# Patient Record
Sex: FEMALE | Race: WHITE | NOT HISPANIC OR LATINO | Employment: UNEMPLOYED | URBAN - METROPOLITAN AREA
[De-identification: names, ages, dates, MRNs, and addresses within clinical notes are randomized per-mention and may not be internally consistent; named-entity substitution may affect disease eponyms.]

---

## 2017-08-04 ENCOUNTER — NURSE TRIAGE (OUTPATIENT)
Dept: ADMINISTRATIVE | Facility: CLINIC | Age: 82
End: 2017-08-04

## 2017-08-04 DIAGNOSIS — I10 ESSENTIAL HYPERTENSION: ICD-10-CM

## 2017-08-04 RX ORDER — LOSARTAN POTASSIUM 25 MG/1
TABLET ORAL
Qty: 90 TABLET | Refills: 0 | Status: CANCELLED | OUTPATIENT
Start: 2017-08-04

## 2017-08-04 RX ORDER — LOSARTAN POTASSIUM 25 MG/1
TABLET ORAL
Qty: 14 TABLET | Refills: 0 | Status: CANCELLED | OUTPATIENT
Start: 2017-08-04

## 2017-08-04 RX ORDER — LOSARTAN POTASSIUM 25 MG/1
25 TABLET ORAL DAILY
Qty: 7 TABLET | Refills: 0 | Status: SHIPPED | OUTPATIENT
Start: 2017-08-04 | End: 2017-08-07 | Stop reason: SDUPTHER

## 2017-08-04 NOTE — TELEPHONE ENCOUNTER
----- Message from Soledad Burch sent at 8/4/2017  3:22 PM CDT -----  Contact: daughter, Josee Hylton  Patient's daughter, Josee Hylton regarding patient's prescription of Losartan. Pharmacy states it was denied and would not giver her any medication. She is out of medication. Frank call daughter at [phone]. Thanks!

## 2017-08-04 NOTE — TELEPHONE ENCOUNTER
"    Reason for Disposition   [1] Request for URGENT new prescription or refill of "essential" medication (i.e., likelihood of harm to patient if not taken) AND [2] triager unable to fill per unit policy    Answer Assessment - Initial Assessment Questions  1. SYMPTOMS: "Do you have any symptoms?"      bp medication refill. No symptoms reported/  2. SEVERITY: If symptoms are present, ask "Are they mild, moderate or severe?"      n/a    Protocols used: ST MEDICATION QUESTION CALL-A-    Patient called for a refill of the losartan. She made an appt for Monday, but she is out. Called Dr. Morgan concerning the request. Dr. Morgan sent enough pills to get the patient through Monday. Patient was notified and verbalized understanding.   "

## 2017-08-04 NOTE — TELEPHONE ENCOUNTER
Dr Morgan  I got pt appt Monday with Paige. She is completley out of meds. Can she have enough for the weekend.

## 2017-08-04 NOTE — TELEPHONE ENCOUNTER
Reason for Disposition   Message left on identified answering machine.    Protocols used: ST NO CONTACT OR DUPLICATE CONTACT CALL-A-  call returned to [phone]--no answer/ left message on     Sonya Graff RN

## 2017-08-07 ENCOUNTER — OFFICE VISIT (OUTPATIENT)
Dept: FAMILY MEDICINE | Facility: CLINIC | Age: 82
End: 2017-08-07
Payer: MEDICARE

## 2017-08-07 ENCOUNTER — LAB VISIT (OUTPATIENT)
Dept: LAB | Facility: HOSPITAL | Age: 82
End: 2017-08-07
Attending: INTERNAL MEDICINE
Payer: MEDICARE

## 2017-08-07 VITALS
WEIGHT: 125.44 LBS | TEMPERATURE: 98 F | SYSTOLIC BLOOD PRESSURE: 132 MMHG | OXYGEN SATURATION: 98 % | DIASTOLIC BLOOD PRESSURE: 78 MMHG | HEIGHT: 62 IN | HEART RATE: 79 BPM | RESPIRATION RATE: 18 BRPM | BODY MASS INDEX: 23.08 KG/M2
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
## 2017-08-07 DIAGNOSIS — I10 ESSENTIAL HYPERTENSION: ICD-10-CM

## 2017-08-07 DIAGNOSIS — M79.10 MYALGIA: ICD-10-CM

## 2017-08-07 LAB
25(OH)D3+25(OH)D2 SERPL-MCNC: 33 NG/ML
ALBUMIN SERPL BCP-MCNC: 3.6 G/DL
ALP SERPL-CCNC: 120 U/L
ALT SERPL W/O P-5'-P-CCNC: 35 U/L
ANION GAP SERPL CALC-SCNC: 9 MMOL/L
AST SERPL-CCNC: 33 U/L
BASOPHILS # BLD AUTO: 0.02 K/UL
BASOPHILS NFR BLD: 0.3 %
BILIRUB SERPL-MCNC: 0.9 MG/DL
BUN SERPL-MCNC: 30 MG/DL
CALCIUM SERPL-MCNC: 9.2 MG/DL
CHLORIDE SERPL-SCNC: 106 MMOL/L
CHOLEST/HDLC SERPL: 2.9 {RATIO}
CO2 SERPL-SCNC: 25 MMOL/L
CREAT SERPL-MCNC: 0.9 MG/DL
DIFFERENTIAL METHOD: ABNORMAL
EOSINOPHIL # BLD AUTO: 0.1 K/UL
EOSINOPHIL NFR BLD: 1.8 %
ERYTHROCYTE [DISTWIDTH] IN BLOOD BY AUTOMATED COUNT: 14.1 %
EST. GFR  (AFRICAN AMERICAN): >60 ML/MIN/1.73 M^2
EST. GFR  (NON AFRICAN AMERICAN): 59.7 ML/MIN/1.73 M^2
GLUCOSE SERPL-MCNC: 88 MG/DL
HCT VFR BLD AUTO: 36.8 %
HDL/CHOLESTEROL RATIO: 34.6 %
HDLC SERPL-MCNC: 101 MG/DL
HDLC SERPL-MCNC: 292 MG/DL
HGB BLD-MCNC: 12.2 G/DL
LDLC SERPL CALC-MCNC: 178.4 MG/DL
LYMPHOCYTES # BLD AUTO: 2.1 K/UL
LYMPHOCYTES NFR BLD: 30.4 %
MCH RBC QN AUTO: 29.9 PG
MCHC RBC AUTO-ENTMCNC: 33.2 G/DL
MCV RBC AUTO: 90 FL
MONOCYTES # BLD AUTO: 0.4 K/UL
MONOCYTES NFR BLD: 6 %
NEUTROPHILS # BLD AUTO: 4.2 K/UL
NEUTROPHILS NFR BLD: 61.4 %
NONHDLC SERPL-MCNC: 191 MG/DL
PLATELET # BLD AUTO: 219 K/UL
PMV BLD AUTO: 12 FL
POTASSIUM SERPL-SCNC: 4.3 MMOL/L
PROT SERPL-MCNC: 7.8 G/DL
RBC # BLD AUTO: 4.08 M/UL
SODIUM SERPL-SCNC: 140 MMOL/L
TRIGL SERPL-MCNC: 63 MG/DL
TSH SERPL DL<=0.005 MIU/L-ACNC: 0.74 UIU/ML
WBC # BLD AUTO: 6.82 K/UL

## 2017-08-07 PROCEDURE — 99999 PR PBB SHADOW E&M-EST. PATIENT-LVL III: CPT | Mod: PBBFAC,,, | Performed by: NURSE PRACTITIONER

## 2017-08-07 PROCEDURE — 80061 LIPID PANEL: CPT

## 2017-08-07 PROCEDURE — 82306 VITAMIN D 25 HYDROXY: CPT

## 2017-08-07 PROCEDURE — 1159F MED LIST DOCD IN RCRD: CPT | Mod: ,,, | Performed by: NURSE PRACTITIONER

## 2017-08-07 PROCEDURE — 1125F AMNT PAIN NOTED PAIN PRSNT: CPT | Mod: ,,, | Performed by: NURSE PRACTITIONER

## 2017-08-07 PROCEDURE — 84443 ASSAY THYROID STIM HORMONE: CPT

## 2017-08-07 PROCEDURE — 85025 COMPLETE CBC W/AUTO DIFF WBC: CPT

## 2017-08-07 PROCEDURE — 80053 COMPREHEN METABOLIC PANEL: CPT

## 2017-08-07 PROCEDURE — 36415 COLL VENOUS BLD VENIPUNCTURE: CPT | Mod: PO

## 2017-08-07 PROCEDURE — 99214 OFFICE O/P EST MOD 30 MIN: CPT | Mod: S$PBB,,, | Performed by: NURSE PRACTITIONER

## 2017-08-07 RX ORDER — LOSARTAN POTASSIUM 25 MG/1
25 TABLET ORAL DAILY
Qty: 90 TABLET | Refills: 1 | Status: SHIPPED | OUTPATIENT
Start: 2017-08-07 | End: 2018-02-08 | Stop reason: SDUPTHER

## 2017-08-08 ENCOUNTER — TELEPHONE (OUTPATIENT)
Dept: FAMILY MEDICINE | Facility: CLINIC | Age: 82
End: 2017-08-08

## 2017-08-08 DIAGNOSIS — E78.5 HYPERLIPIDEMIA, UNSPECIFIED HYPERLIPIDEMIA TYPE: Primary | ICD-10-CM

## 2017-08-08 RX ORDER — PRAVASTATIN SODIUM 40 MG/1
40 TABLET ORAL DAILY
Qty: 90 TABLET | Refills: 1 | Status: SHIPPED | OUTPATIENT
Start: 2017-08-08 | End: 2018-02-08 | Stop reason: SDUPTHER

## 2017-08-08 NOTE — TELEPHONE ENCOUNTER
Spoke with patient, updated on labs, discussed hyperlipidemia. Educated on diet, exercise and medication. Med sent to pharmacy.    Please call and schedule her for repeat labs in 6 weeks (hepatic function panel and lipid panel---make sure she knows it must be fasting).

## 2017-08-09 NOTE — TELEPHONE ENCOUNTER
----- Message from Josee Arcos sent at 8/8/2017  4:33 PM CDT -----  Contact:   granddaughter katt//    Calling to  Speak to the  Nurse about   Lab  results

## 2017-09-20 ENCOUNTER — LAB VISIT (OUTPATIENT)
Dept: LAB | Facility: HOSPITAL | Age: 82
End: 2017-09-20
Attending: INTERNAL MEDICINE
Payer: MEDICARE

## 2017-09-20 DIAGNOSIS — E78.5 HYPERLIPIDEMIA, UNSPECIFIED HYPERLIPIDEMIA TYPE: ICD-10-CM

## 2017-09-20 LAB
ALBUMIN SERPL BCP-MCNC: 3.5 G/DL
ALP SERPL-CCNC: 95 U/L
ALT SERPL W/O P-5'-P-CCNC: 19 U/L
AST SERPL-CCNC: 26 U/L
BILIRUB DIRECT SERPL-MCNC: 0.3 MG/DL
BILIRUB SERPL-MCNC: 0.8 MG/DL
CHOLEST SERPL-MCNC: 231 MG/DL
CHOLEST/HDLC SERPL: 2.8 {RATIO}
HDLC SERPL-MCNC: 83 MG/DL
HDLC SERPL: 35.9 %
LDLC SERPL CALC-MCNC: 133.8 MG/DL
NONHDLC SERPL-MCNC: 148 MG/DL
PROT SERPL-MCNC: 7.5 G/DL
TRIGL SERPL-MCNC: 71 MG/DL

## 2017-09-20 PROCEDURE — 80061 LIPID PANEL: CPT

## 2017-09-20 PROCEDURE — 80076 HEPATIC FUNCTION PANEL: CPT

## 2017-09-20 PROCEDURE — 36415 COLL VENOUS BLD VENIPUNCTURE: CPT | Mod: PO

## 2017-09-21 ENCOUNTER — TELEPHONE (OUTPATIENT)
Dept: FAMILY MEDICINE | Facility: CLINIC | Age: 82
End: 2017-09-21

## 2017-09-22 NOTE — TELEPHONE ENCOUNTER
Please call and tell labs stable--cholesterol improved; follow up with Dr. Morgan in 3 weeks as scheduled

## 2017-10-15 NOTE — PROGRESS NOTES
"HISTORY OF PRESENT ILLNESS:  Pt. is a 83 y.o. female presents .  I have  Not seen her in almost 4 years, at our initial appt.  9/6/16 she was seen by [facility] after syncopal admission to [facility]:    "Hospital Course (synopsis of major diagnoses, care, treatment, and services provided during the course of the hospital stay): Patient was admitted after fall with near syncope. Patient was monitored on telemetry and underwent carotid ultrasound and 2d echocardiogram. Patient had orthostatics as well and was symptomatic but blood pressure and heart rate remained stable. Patient was felt to be volume depleted and hydrated with improvement of symptoms. Patient given close follow up."    This year see was see by NP for HTN, fall risk, and myalgias.  She was started on pravastatin 40 mg and had nice response.  Vitamin D was at acceptable range.    Had 2 D echo:    CONCLUSIONS     1 - Normal left ventricular systolic function (EF 55-60%).     2 - Mild left atrial enlargement.     3 - Left ventricular diastolic dysfunction.     4 - Mild aortic regurgitation.     5 - Mild mitral regurgitation.     6 - Mild to moderate tricuspid regurgitation."    Lab Results   Component Value Date    WBC 6.82 08/07/2017    HGB 12.2 08/07/2017    HCT 36.8 (L) 08/07/2017     08/07/2017    CHOL 231 (H) 09/20/2017    TRIG 71 09/20/2017    HDL 83 (H) 09/20/2017    ALT 19 09/20/2017    AST 26 09/20/2017     08/07/2017    K 4.3 08/07/2017     08/07/2017    CREATININE 0.9 08/07/2017    BUN 30 (H) 08/07/2017    CO2 25 08/07/2017    TSH 0.744 08/07/2017     Lab Results   Component Value Date    LDLCALC 133.8 09/20/2017                ROS:  GENERAL: No fever, chills, fatigability or weight loss.  SKIN: No rashes, itching or changes in color or texture of skin.  HEAD: No headaches or recent head trauma.  EARS: Denies ear pain, discharge or vertigo.  NOSE: No loss of smell, no epistaxis; positive postnasal drip.  MOUTH & THROAT: No hoarseness or " change in voice. No excessive gum bleeding.  NODES: Denies swollen glands.  CHEST: Denies CAMPOS, cyanosis, wheezing, cough and sputum production.  CARDIOVASCULAR: Denies chest pain, PND, orthopnea or reduced exercise tolerance; denies palpatiations'  ABDOMEN: Appetite fine. No weight loss. Denies constipation, diarrhea, abdominal pain, hematemesis or blood in stool.  URINARY: No flank pain, dysuria or hematuria.  PERIPHERAL VASCULAR: No claudication or cyanosis. No edema.  MUSCULOSKELETAL: Positive joint stiffness; no swelling. Denies back pain.  NEUROLOGIC: Denies numbness    PE:   Vitals:   Vitals:    10/16/17 1032   BP: (!) 140/80   Pulse: 81   Resp: 20     My repeat: 138/64    GENERAL: no acute distress, A&Ox3, comfortable.  Female with BMI of 22   HEENT: tympanic membranes clear, nasal mucosa pink, no pharyngeal erythema or exudate  NECK: supple, no cervical lymphadenopathy, no thyromegaly; no supraclavicular nodes;   CHEST:  clear to auscultation bilaterally, no crackles or wheeze; no increased work of breathing;  CARDIOVASCULAR: regular rate and rhythm, no rubs, I/VI murmurs; no gallops;  ABDOMEN: normal bowel sounds, soft non-tender, non-distended; no palpable organomegaly;   EXT: no clubbing, cyanosis or edema.     ASSESSMENT/PLAN:    HTN: in control on current med; will continue    Hyperlipidemia: at acceptable control on statin;    Health Maintenance: Rx for Tdap given today; flu shot given;      Call if condition changes or worsens.

## 2017-10-16 ENCOUNTER — OFFICE VISIT (OUTPATIENT)
Dept: INTERNAL MEDICINE | Facility: CLINIC | Age: 82
End: 2017-10-16
Payer: MEDICARE

## 2017-10-16 ENCOUNTER — TELEPHONE (OUTPATIENT)
Dept: FAMILY MEDICINE | Facility: CLINIC | Age: 82
End: 2017-10-16

## 2017-10-16 VITALS
RESPIRATION RATE: 20 BRPM | BODY MASS INDEX: 22.52 KG/M2 | OXYGEN SATURATION: 98 % | HEIGHT: 62 IN | SYSTOLIC BLOOD PRESSURE: 138 MMHG | HEART RATE: 81 BPM | WEIGHT: 122.38 LBS | DIASTOLIC BLOOD PRESSURE: 64 MMHG

## 2017-10-16 DIAGNOSIS — Z00.00 HEALTH CARE MAINTENANCE: ICD-10-CM

## 2017-10-16 DIAGNOSIS — I10 HYPERTENSION, UNSPECIFIED TYPE: Primary | ICD-10-CM

## 2017-10-16 DIAGNOSIS — E78.5 HYPERLIPIDEMIA, UNSPECIFIED HYPERLIPIDEMIA TYPE: ICD-10-CM

## 2017-10-16 PROCEDURE — 99213 OFFICE O/P EST LOW 20 MIN: CPT | Mod: S$PBB,,, | Performed by: INTERNAL MEDICINE

## 2017-10-16 PROCEDURE — G0008 ADMIN INFLUENZA VIRUS VAC: HCPCS | Mod: PBBFAC,PO

## 2017-10-16 PROCEDURE — 99999 PR PBB SHADOW E&M-EST. PATIENT-LVL III: CPT | Mod: PBBFAC,,, | Performed by: INTERNAL MEDICINE

## 2017-10-16 PROCEDURE — 99213 OFFICE O/P EST LOW 20 MIN: CPT | Mod: PBBFAC,PO,25 | Performed by: INTERNAL MEDICINE

## 2017-10-17 NOTE — TELEPHONE ENCOUNTER
Please notify pt. That she is due for PRevnar 13.  Have her get one either at [facility] or here.

## 2017-12-12 ENCOUNTER — TELEPHONE (OUTPATIENT)
Dept: INTERNAL MEDICINE | Facility: CLINIC | Age: 82
End: 2017-12-12

## 2017-12-12 NOTE — TELEPHONE ENCOUNTER
----- Message from Ashley Garibay sent at 12/12/2017  9:46 AM CST -----  Daughter/Josee is calling concerning because family member who lives in the house with patient, has come down with the flu. Daughter is calling to see if office can prescribe Tamiflu. Please remit to:      [facility] [address] [place] [address] AT [facility] & [facility] [address] [place] [address]  Phone: [phone] Fax: [phone]    Please call with questions or when completed at [phone].

## 2017-12-12 NOTE — TELEPHONE ENCOUNTER
Notified that we do not prescribe tamiflu unless a patient comes in to the clinic and tests positive for the flu. An appointment with any available provider was recommended if they would like to be tested

## 2018-01-22 ENCOUNTER — TELEPHONE (OUTPATIENT)
Dept: INTERNAL MEDICINE | Facility: CLINIC | Age: 83
End: 2018-01-22

## 2018-01-22 DIAGNOSIS — Z12.31 ENCOUNTER FOR SCREENING MAMMOGRAM FOR MALIGNANT NEOPLASM OF BREAST: ICD-10-CM

## 2018-01-22 DIAGNOSIS — Z00.00 HEALTH CARE MAINTENANCE: Primary | ICD-10-CM

## 2018-01-22 NOTE — TELEPHONE ENCOUNTER
----- Message from Paul Steen sent at 1/22/2018 11:37 AM CST -----  Contact: Dtr/Josee Tripp called in regarding the attached patient (mom).  Josee wanted to see if Dr. Morgan could send an order for patients mammo to the [facility] on [address] in [place]?  Patient no longer goes to OB/GYN so wanted to see if Dr. Morgan could do this.    Josee's call back number is [phone]

## 2018-02-08 ENCOUNTER — TELEPHONE (OUTPATIENT)
Dept: INTERNAL MEDICINE | Facility: CLINIC | Age: 83
End: 2018-02-08

## 2018-02-08 DIAGNOSIS — E78.5 HYPERLIPIDEMIA, UNSPECIFIED HYPERLIPIDEMIA TYPE: ICD-10-CM

## 2018-02-08 DIAGNOSIS — I10 ESSENTIAL HYPERTENSION: ICD-10-CM

## 2018-02-08 RX ORDER — LOSARTAN POTASSIUM 25 MG/1
TABLET ORAL
Qty: 90 TABLET | Refills: 1 | Status: SHIPPED | OUTPATIENT
Start: 2018-02-08 | End: 2018-08-08 | Stop reason: SDUPTHER

## 2018-02-08 RX ORDER — PRAVASTATIN SODIUM 40 MG/1
TABLET ORAL
Qty: 90 TABLET | Refills: 1 | Status: SHIPPED | OUTPATIENT
Start: 2018-02-08 | End: 2018-08-08 | Stop reason: SDUPTHER

## 2018-02-08 NOTE — TELEPHONE ENCOUNTER
----- Message from Naya Duarte sent at 2/8/2018  2:20 PM CST -----  Contact: Josee Cintronon (Daughter)  Josee Cintronon (Daughter) calling to request a new prescription for losartan (COZAAR) 25 MG tablet and  pravastatin (PRAVACHOL) 40 MG tablet. Patient is out of refills. Please advise.   Call back [phone]  Thank you!    [facility] [address] [place] [address] AT [facility] [address] & [address] [address]  Phone: [phone] Fax: [phone]

## 2018-08-08 ENCOUNTER — TELEPHONE (OUTPATIENT)
Dept: FAMILY MEDICINE | Facility: CLINIC | Age: 83
End: 2018-08-08

## 2018-08-08 DIAGNOSIS — E78.5 HYPERLIPIDEMIA, UNSPECIFIED HYPERLIPIDEMIA TYPE: ICD-10-CM

## 2018-08-08 DIAGNOSIS — I10 ESSENTIAL HYPERTENSION: ICD-10-CM

## 2018-08-08 RX ORDER — LOSARTAN POTASSIUM 25 MG/1
TABLET ORAL
Qty: 30 TABLET | Refills: 0 | Status: SHIPPED | OUTPATIENT
Start: 2018-08-08 | End: 2018-08-20 | Stop reason: SDUPTHER

## 2018-08-08 RX ORDER — PRAVASTATIN SODIUM 40 MG/1
TABLET ORAL
Qty: 30 TABLET | Refills: 0 | Status: SHIPPED | OUTPATIENT
Start: 2018-08-08 | End: 2018-08-20 | Stop reason: SDUPTHER

## 2018-08-08 RX ORDER — LOSARTAN POTASSIUM 25 MG/1
TABLET ORAL
Qty: 90 TABLET | Refills: 0 | OUTPATIENT
Start: 2018-08-08

## 2018-08-08 RX ORDER — PRAVASTATIN SODIUM 40 MG/1
TABLET ORAL
Qty: 90 TABLET | Refills: 0 | OUTPATIENT
Start: 2018-08-08

## 2018-08-08 NOTE — TELEPHONE ENCOUNTER
Due for annual/needs to establish care with new MD, please schedule with MD within the next month or with me for annual

## 2018-08-20 ENCOUNTER — LAB VISIT (OUTPATIENT)
Dept: LAB | Facility: HOSPITAL | Age: 83
End: 2018-08-20
Attending: NURSE PRACTITIONER
Payer: MEDICARE

## 2018-08-20 ENCOUNTER — OFFICE VISIT (OUTPATIENT)
Dept: FAMILY MEDICINE | Facility: CLINIC | Age: 83
End: 2018-08-20
Payer: MEDICARE

## 2018-08-20 VITALS
RESPIRATION RATE: 16 BRPM | HEART RATE: 78 BPM | SYSTOLIC BLOOD PRESSURE: 128 MMHG | WEIGHT: 122.38 LBS | DIASTOLIC BLOOD PRESSURE: 74 MMHG | HEIGHT: 62 IN | OXYGEN SATURATION: 98 % | BODY MASS INDEX: 22.52 KG/M2 | TEMPERATURE: 98 F

## 2018-08-20 DIAGNOSIS — Z79.899 OTHER LONG TERM (CURRENT) DRUG THERAPY: ICD-10-CM

## 2018-08-20 DIAGNOSIS — I10 ESSENTIAL HYPERTENSION: Primary | ICD-10-CM

## 2018-08-20 DIAGNOSIS — E78.2 MIXED HYPERLIPIDEMIA: ICD-10-CM

## 2018-08-20 DIAGNOSIS — I10 ESSENTIAL HYPERTENSION: ICD-10-CM

## 2018-08-20 LAB
25(OH)D3+25(OH)D2 SERPL-MCNC: 32 NG/ML
ALBUMIN SERPL BCP-MCNC: 3.8 G/DL
ALP SERPL-CCNC: 101 U/L
ALT SERPL W/O P-5'-P-CCNC: 23 U/L
ANION GAP SERPL CALC-SCNC: 7 MMOL/L
AST SERPL-CCNC: 27 U/L
BASOPHILS # BLD AUTO: 0.06 K/UL
BASOPHILS NFR BLD: 1 %
BILIRUB SERPL-MCNC: 1 MG/DL
BUN SERPL-MCNC: 25 MG/DL
CALCIUM SERPL-MCNC: 9.7 MG/DL
CHLORIDE SERPL-SCNC: 105 MMOL/L
CHOLEST SERPL-MCNC: 257 MG/DL
CHOLEST/HDLC SERPL: 2.7 {RATIO}
CO2 SERPL-SCNC: 28 MMOL/L
CREAT SERPL-MCNC: 1 MG/DL
DIFFERENTIAL METHOD: ABNORMAL
EOSINOPHIL # BLD AUTO: 0.2 K/UL
EOSINOPHIL NFR BLD: 2.6 %
ERYTHROCYTE [DISTWIDTH] IN BLOOD BY AUTOMATED COUNT: 13.9 %
EST. GFR  (AFRICAN AMERICAN): 59.8 ML/MIN/1.73 M^2
EST. GFR  (NON AFRICAN AMERICAN): 51.9 ML/MIN/1.73 M^2
GLUCOSE SERPL-MCNC: 98 MG/DL
HCT VFR BLD AUTO: 38.9 %
HDLC SERPL-MCNC: 95 MG/DL
HDLC SERPL: 37 %
HGB BLD-MCNC: 12.5 G/DL
IMM GRANULOCYTES # BLD AUTO: 0.01 K/UL
IMM GRANULOCYTES NFR BLD AUTO: 0.2 %
LDLC SERPL CALC-MCNC: 146 MG/DL
LYMPHOCYTES # BLD AUTO: 1.7 K/UL
LYMPHOCYTES NFR BLD: 28.3 %
MCH RBC QN AUTO: 30.6 PG
MCHC RBC AUTO-ENTMCNC: 32.1 G/DL
MCV RBC AUTO: 95 FL
MONOCYTES # BLD AUTO: 0.4 K/UL
MONOCYTES NFR BLD: 7.1 %
NEUTROPHILS # BLD AUTO: 3.7 K/UL
NEUTROPHILS NFR BLD: 60.8 %
NONHDLC SERPL-MCNC: 162 MG/DL
NRBC BLD-RTO: 0 /100 WBC
PLATELET # BLD AUTO: 202 K/UL
PMV BLD AUTO: 13.2 FL
POTASSIUM SERPL-SCNC: 5.1 MMOL/L
PROT SERPL-MCNC: 7.7 G/DL
RBC # BLD AUTO: 4.09 M/UL
SODIUM SERPL-SCNC: 140 MMOL/L
TRIGL SERPL-MCNC: 80 MG/DL
TSH SERPL DL<=0.005 MIU/L-ACNC: 0.84 UIU/ML
WBC # BLD AUTO: 6.04 K/UL

## 2018-08-20 PROCEDURE — 85025 COMPLETE CBC W/AUTO DIFF WBC: CPT

## 2018-08-20 PROCEDURE — 99999 PR PBB SHADOW E&M-EST. PATIENT-LVL III: CPT | Mod: PBBFAC,,, | Performed by: NURSE PRACTITIONER

## 2018-08-20 PROCEDURE — 84443 ASSAY THYROID STIM HORMONE: CPT

## 2018-08-20 PROCEDURE — 36415 COLL VENOUS BLD VENIPUNCTURE: CPT | Mod: PO

## 2018-08-20 PROCEDURE — 80061 LIPID PANEL: CPT

## 2018-08-20 PROCEDURE — 99214 OFFICE O/P EST MOD 30 MIN: CPT | Mod: S$PBB,,, | Performed by: NURSE PRACTITIONER

## 2018-08-20 PROCEDURE — 80053 COMPREHEN METABOLIC PANEL: CPT

## 2018-08-20 PROCEDURE — 82306 VITAMIN D 25 HYDROXY: CPT

## 2018-08-20 PROCEDURE — 99213 OFFICE O/P EST LOW 20 MIN: CPT | Mod: PBBFAC,PO | Performed by: NURSE PRACTITIONER

## 2018-08-20 RX ORDER — LOSARTAN POTASSIUM 25 MG/1
25 TABLET ORAL DAILY
Qty: 90 TABLET | Refills: 1 | Status: SHIPPED | OUTPATIENT
Start: 2018-08-20 | End: 2019-02-20 | Stop reason: SDUPTHER

## 2018-08-20 RX ORDER — PRAVASTATIN SODIUM 40 MG/1
TABLET ORAL
Qty: 90 TABLET | Refills: 1 | Status: SHIPPED | OUTPATIENT
Start: 2018-08-20 | End: 2019-02-20 | Stop reason: SDUPTHER

## 2018-08-20 NOTE — PROGRESS NOTES
Subjective:       Patient ID: Muna Cedillo is a 84 y.o. female.    Chief Complaint: Annual Exam    Ms. Cedillo is a known patient to me. She presents today for annual    HPI   HTN: controlled  HLD: due for labs; pravastatin    Previous patient of Dr Morgan--transition to Dr Franco    Without complaints    Vitals:    08/20/18 1044   BP: 128/74   Pulse: 78   Resp: 16   Temp: 98.1 °F (36.7 °C)     Review of Systems   Constitutional: Negative for diaphoresis and fever.   HENT: Negative for facial swelling and trouble swallowing.    Eyes: Negative for discharge and redness.   Respiratory: Negative for cough and shortness of breath.    Cardiovascular: Negative for chest pain and palpitations.   Gastrointestinal: Negative for abdominal pain and diarrhea.   Genitourinary: Negative for difficulty urinating.   Musculoskeletal: Negative for gait problem.   Skin: Negative for rash and wound.   Neurological: Negative for facial asymmetry and speech difficulty.   Psychiatric/Behavioral: Negative for confusion. The patient is not nervous/anxious.        Past Medical History:   Diagnosis Date    Arthritis     back pain    Breast cancer 1998    was treated with radiation    Hypertension     Shingles      Objective:      Physical Exam   Constitutional: She is oriented to person, place, and time. She does not have a sickly appearance. No distress.   HENT:   Head: Normocephalic.   Right Ear: Hearing, tympanic membrane, external ear and ear canal normal.   Left Ear: Hearing, tympanic membrane, external ear and ear canal normal.   Nose: Nose normal.   Mouth/Throat: Uvula is midline, oropharynx is clear and moist and mucous membranes are normal.   Eyes: Conjunctivae and lids are normal.   Neck: No JVD present. No tracheal deviation present.   Cardiovascular: Normal rate and normal heart sounds.   Negative LE edema   Pulmonary/Chest: Effort normal and breath sounds normal.   Abdominal: Normal appearance. She exhibits no distension.    Musculoskeletal: She exhibits no deformity.   Neurological: She is alert and oriented to person, place, and time.   Skin: She is not diaphoretic. No pallor.   Psychiatric: She has a normal mood and affect. Her speech is normal and behavior is normal. Judgment and thought content normal. Cognition and memory are normal.   Nursing note and vitals reviewed.      Assessment:       1. Essential hypertension    2. Mixed hyperlipidemia    3. Other long term (current) drug therapy         Plan:       Essential hypertension  -     CBC auto differential; Future; Expected date: 08/20/2018  -     TSH; Future; Expected date: 08/20/2018  -     losartan (COZAAR) 25 MG tablet; Take 1 tablet (25 mg total) by mouth once daily.  Dispense: 90 tablet; Refill: 1    Mixed hyperlipidemia  -     Comprehensive metabolic panel; Future; Expected date: 08/20/2018  -     Lipid panel; Future; Expected date: 08/20/2018  -     pravastatin (PRAVACHOL) 40 MG tablet; TAKE 1 TABLET(40 MG) BY MOUTH EVERY DAY  Dispense: 90 tablet; Refill: 1    Other long term (current) drug therapy   -     Vitamin D; Future; Expected date: 08/20/2018    handwritten Rx for tdap, prevnar 13 and shingrix     Follow-up in about 6 months (around 2/20/2019) for establish care with MD, with me as needed.

## 2018-08-21 ENCOUNTER — PATIENT MESSAGE (OUTPATIENT)
Dept: FAMILY MEDICINE | Facility: CLINIC | Age: 83
End: 2018-08-21

## 2018-09-12 DIAGNOSIS — E78.5 HYPERLIPIDEMIA, UNSPECIFIED HYPERLIPIDEMIA TYPE: ICD-10-CM

## 2018-09-12 DIAGNOSIS — I10 ESSENTIAL HYPERTENSION: ICD-10-CM

## 2018-09-12 RX ORDER — LOSARTAN POTASSIUM 25 MG/1
TABLET ORAL
Qty: 30 TABLET | Refills: 0 | OUTPATIENT
Start: 2018-09-12

## 2018-09-12 RX ORDER — PRAVASTATIN SODIUM 40 MG/1
TABLET ORAL
Qty: 30 TABLET | Refills: 0 | OUTPATIENT
Start: 2018-09-12

## 2019-02-20 ENCOUNTER — OFFICE VISIT (OUTPATIENT)
Dept: FAMILY MEDICINE | Facility: CLINIC | Age: 84
End: 2019-02-20
Payer: MEDICARE

## 2019-02-20 VITALS
BODY MASS INDEX: 23.13 KG/M2 | OXYGEN SATURATION: 96 % | WEIGHT: 125.69 LBS | SYSTOLIC BLOOD PRESSURE: 143 MMHG | HEART RATE: 92 BPM | HEIGHT: 62 IN | TEMPERATURE: 98 F | DIASTOLIC BLOOD PRESSURE: 80 MMHG

## 2019-02-20 DIAGNOSIS — I10 ESSENTIAL HYPERTENSION: Primary | ICD-10-CM

## 2019-02-20 DIAGNOSIS — J30.1 SEASONAL ALLERGIC RHINITIS DUE TO POLLEN: ICD-10-CM

## 2019-02-20 DIAGNOSIS — Z23 NEED FOR VACCINATION: ICD-10-CM

## 2019-02-20 DIAGNOSIS — E78.2 MIXED HYPERLIPIDEMIA: ICD-10-CM

## 2019-02-20 PROCEDURE — 99999 PR PBB SHADOW E&M-EST. PATIENT-LVL III: ICD-10-PCS | Mod: PBBFAC,,, | Performed by: FAMILY MEDICINE

## 2019-02-20 PROCEDURE — 99999 PR PBB SHADOW E&M-EST. PATIENT-LVL III: CPT | Mod: PBBFAC,,, | Performed by: FAMILY MEDICINE

## 2019-02-20 PROCEDURE — 99214 OFFICE O/P EST MOD 30 MIN: CPT | Mod: S$PBB,,, | Performed by: FAMILY MEDICINE

## 2019-02-20 PROCEDURE — 99213 OFFICE O/P EST LOW 20 MIN: CPT | Mod: PBBFAC,PO,25 | Performed by: FAMILY MEDICINE

## 2019-02-20 PROCEDURE — G0009 ADMIN PNEUMOCOCCAL VACCINE: HCPCS | Mod: PBBFAC,PO

## 2019-02-20 PROCEDURE — 99214 PR OFFICE/OUTPT VISIT, EST, LEVL IV, 30-39 MIN: ICD-10-PCS | Mod: S$PBB,,, | Performed by: FAMILY MEDICINE

## 2019-02-20 RX ORDER — LEVOCETIRIZINE DIHYDROCHLORIDE 5 MG/1
5 TABLET, FILM COATED ORAL NIGHTLY
Qty: 30 TABLET | Refills: 11 | Status: SHIPPED | OUTPATIENT
Start: 2019-02-20 | End: 2019-08-20

## 2019-02-20 RX ORDER — LOSARTAN POTASSIUM 25 MG/1
25 TABLET ORAL DAILY
Qty: 90 TABLET | Refills: 1 | Status: SHIPPED | OUTPATIENT
Start: 2019-02-20 | End: 2019-08-15 | Stop reason: SDUPTHER

## 2019-02-20 RX ORDER — PRAVASTATIN SODIUM 40 MG/1
TABLET ORAL
Qty: 90 TABLET | Refills: 1 | Status: SHIPPED | OUTPATIENT
Start: 2019-02-20 | End: 2019-08-15 | Stop reason: SDUPTHER

## 2019-02-20 NOTE — PROGRESS NOTES
"Subjective:       Patient ID: Muna Cedillo is a 84 y.o. female.    Chief Complaint: Follow-up (6 month~pt c/o cough)    This pt is new to me.  The pt is followed for HTN and HLD.  She did not take her meds yet today.  She usually takes her meds late morning.  She has had a cough for several weeks. She was treated for bronchitis about 2 months ago at an urgent care.      Review of Systems   Constitutional: Negative for activity change, appetite change, fatigue and unexpected weight change.   HENT: Positive for postnasal drip.    Eyes: Negative for visual disturbance.   Respiratory: Positive for cough. Negative for chest tightness and shortness of breath.    Cardiovascular: Negative for chest pain, palpitations and leg swelling.   Gastrointestinal: Negative for abdominal pain, constipation, diarrhea, nausea and vomiting.   Endocrine: Negative for cold intolerance, heat intolerance and polyuria.   Genitourinary: Negative for decreased urine volume and dysuria.   Musculoskeletal: Negative for arthralgias and back pain.   Skin: Negative for rash.   Neurological: Negative for numbness and headaches.   Psychiatric/Behavioral: Negative for sleep disturbance.       Objective:       Vitals:    02/20/19 1009   BP: (!) 143/80   BP Location: Right arm   Patient Position: Sitting   BP Method: Medium (Manual)   Pulse: 92   Temp: 98 °F (36.7 °C)   TempSrc: Oral   SpO2: 96%   Weight: 57 kg (125 lb 10.6 oz)   Height: 5' 2" (1.575 m)     Physical Exam   Constitutional: She is oriented to person, place, and time. She appears well-developed and well-nourished.   HENT:   Head: Normocephalic.   Post nasal drainage   Eyes: Conjunctivae and EOM are normal. Pupils are equal, round, and reactive to light.   Neck: Normal range of motion. Neck supple. No thyromegaly present.   Cardiovascular: Normal rate, regular rhythm and normal heart sounds.   Pulmonary/Chest: Effort normal and breath sounds normal.   Abdominal: Soft. Bowel sounds are " normal. There is no tenderness.   Musculoskeletal: Normal range of motion. She exhibits no tenderness or deformity.   Lymphadenopathy:     She has no cervical adenopathy.   Neurological: She is alert and oriented to person, place, and time. She displays normal reflexes. No cranial nerve deficit. She exhibits normal muscle tone. Coordination normal.   Skin: Skin is warm and dry.   Psychiatric: She has a normal mood and affect. Her behavior is normal.       Assessment:       1. Essential hypertension    2. Need for vaccination    3. Mixed hyperlipidemia    4. Seasonal allergic rhinitis due to pollen        Plan:       Muna was seen today for follow-up.    Diagnoses and all orders for this visit:    Essential hypertension  -     losartan (COZAAR) 25 MG tablet; Take 1 tablet (25 mg total) by mouth once daily.    Need for vaccination  -     (In Office Administered) Pneumococcal Conjugate Vaccine (13 Valent) (IM)    Mixed hyperlipidemia  -     pravastatin (PRAVACHOL) 40 MG tablet; TAKE 1 TABLET(40 MG) BY MOUTH EVERY DAY    Seasonal allergic rhinitis due to pollen  -     levocetirizine (XYZAL) 5 MG tablet; Take 1 tablet (5 mg total) by mouth every evening.      During this visit, I reviewed the pt's history, medications, allergies, and problem list.

## 2019-08-15 DIAGNOSIS — E78.2 MIXED HYPERLIPIDEMIA: ICD-10-CM

## 2019-08-15 DIAGNOSIS — I10 ESSENTIAL HYPERTENSION: ICD-10-CM

## 2019-08-15 RX ORDER — LOSARTAN POTASSIUM 25 MG/1
TABLET ORAL
Qty: 90 TABLET | Refills: 0 | Status: SHIPPED | OUTPATIENT
Start: 2019-08-15 | End: 2019-10-09 | Stop reason: SDUPTHER

## 2019-08-15 RX ORDER — PRAVASTATIN SODIUM 40 MG/1
TABLET ORAL
Qty: 90 TABLET | Refills: 0 | Status: SHIPPED | OUTPATIENT
Start: 2019-08-15 | End: 2019-10-09 | Stop reason: SDUPTHER

## 2019-08-20 ENCOUNTER — OFFICE VISIT (OUTPATIENT)
Dept: FAMILY MEDICINE | Facility: CLINIC | Age: 84
End: 2019-08-20
Payer: MEDICARE

## 2019-08-20 VITALS
DIASTOLIC BLOOD PRESSURE: 82 MMHG | BODY MASS INDEX: 23.17 KG/M2 | HEIGHT: 62 IN | HEART RATE: 83 BPM | WEIGHT: 125.88 LBS | TEMPERATURE: 98 F | OXYGEN SATURATION: 96 % | SYSTOLIC BLOOD PRESSURE: 148 MMHG

## 2019-08-20 DIAGNOSIS — I10 ESSENTIAL HYPERTENSION: Primary | ICD-10-CM

## 2019-08-20 DIAGNOSIS — J30.1 SEASONAL ALLERGIC RHINITIS DUE TO POLLEN: ICD-10-CM

## 2019-08-20 DIAGNOSIS — E78.2 MIXED HYPERLIPIDEMIA: ICD-10-CM

## 2019-08-20 PROCEDURE — 99999 PR PBB SHADOW E&M-EST. PATIENT-LVL III: CPT | Mod: PBBFAC,,, | Performed by: FAMILY MEDICINE

## 2019-08-20 PROCEDURE — 99214 OFFICE O/P EST MOD 30 MIN: CPT | Mod: S$PBB,,, | Performed by: FAMILY MEDICINE

## 2019-08-20 PROCEDURE — 99213 OFFICE O/P EST LOW 20 MIN: CPT | Mod: PBBFAC,PO | Performed by: FAMILY MEDICINE

## 2019-08-20 PROCEDURE — 99214 PR OFFICE/OUTPT VISIT, EST, LEVL IV, 30-39 MIN: ICD-10-PCS | Mod: S$PBB,,, | Performed by: FAMILY MEDICINE

## 2019-08-20 PROCEDURE — 99999 PR PBB SHADOW E&M-EST. PATIENT-LVL III: ICD-10-PCS | Mod: PBBFAC,,, | Performed by: FAMILY MEDICINE

## 2019-08-20 RX ORDER — LEVOCETIRIZINE DIHYDROCHLORIDE 5 MG/1
5 TABLET, FILM COATED ORAL NIGHTLY
Qty: 30 TABLET | Refills: 11 | Status: SHIPPED | OUTPATIENT
Start: 2019-08-20 | End: 2019-12-19

## 2019-08-20 NOTE — PROGRESS NOTES
"Subjective:       Patient ID: Muna Cedillo is a 85 y.o. female.    Chief Complaint: Follow-up (6 month~pt c/o wheezing)    Pt is known to me.  The pt is feeling well but she has post nasal drainage.  She has been out of Xyzal.  She has some cough that recently started.  She denies wheezing.  The pt takes her BP pill at night.  She says she gets nervous when she comes to the doctor.      Review of Systems   Constitutional: Negative for activity change, appetite change, fatigue and unexpected weight change.   HENT: Positive for postnasal drip.    Eyes: Negative for visual disturbance.   Respiratory: Positive for cough. Negative for chest tightness and shortness of breath.    Cardiovascular: Negative for chest pain, palpitations and leg swelling.   Gastrointestinal: Negative for abdominal pain, constipation, diarrhea, nausea and vomiting.   Endocrine: Negative for cold intolerance, heat intolerance and polyuria.   Genitourinary: Negative for decreased urine volume and dysuria.   Musculoskeletal: Negative for arthralgias and back pain.   Skin: Negative for rash.   Neurological: Negative for numbness and headaches.   Psychiatric/Behavioral: Negative for sleep disturbance.       Objective:       Vitals:    08/20/19 1022   BP: (!) 140/82   BP Location: Right arm   Patient Position: Sitting   BP Method: Medium (Manual)   Pulse: 83   Temp: 98.3 °F (36.8 °C)   TempSrc: Oral   SpO2: 96%   Weight: 57.1 kg (125 lb 14.1 oz)   Height: 5' 2" (1.575 m)     Physical Exam   Constitutional: She is oriented to person, place, and time. She appears well-developed and well-nourished.   HENT:   Head: Normocephalic.   Post nasal drainage, boggy nasal mucosa   Eyes: Pupils are equal, round, and reactive to light. Conjunctivae and EOM are normal.   Neck: Normal range of motion. Neck supple. No thyromegaly present.   Cardiovascular: Normal rate, regular rhythm and normal heart sounds.   Pulmonary/Chest: Effort normal and breath sounds " normal.   Abdominal: Soft. Bowel sounds are normal. There is no tenderness.   Musculoskeletal: Normal range of motion. She exhibits no tenderness or deformity.   Lymphadenopathy:     She has no cervical adenopathy.   Neurological: She is alert and oriented to person, place, and time. She displays normal reflexes. No cranial nerve deficit. She exhibits normal muscle tone. Coordination normal.   Skin: Skin is warm and dry.   Psychiatric: She has a normal mood and affect. Her behavior is normal.       Assessment:       1. Essential hypertension    2. Mixed hyperlipidemia    3. Seasonal allergic rhinitis due to pollen        Plan:       Muna was seen today for follow-up.    Diagnoses and all orders for this visit:    Essential hypertension    Mixed hyperlipidemia    Seasonal allergic rhinitis due to pollen  -     levocetirizine (XYZAL) 5 MG tablet; Take 1 tablet (5 mg total) by mouth every evening. For allergy and sinus      During this visit, I reviewed the pt's history, medications, allergies, and problem list.

## 2019-10-09 ENCOUNTER — HOSPITAL ENCOUNTER (OUTPATIENT)
Dept: RADIOLOGY | Facility: HOSPITAL | Age: 84
Discharge: HOME | End: 2019-10-09
Attending: FAMILY MEDICINE
Payer: MEDICARE

## 2019-10-09 ENCOUNTER — OFFICE VISIT (OUTPATIENT)
Dept: FAMILY MEDICINE | Facility: CLINIC | Age: 84
End: 2019-10-09
Payer: MEDICARE

## 2019-10-09 VITALS
WEIGHT: 128.31 LBS | DIASTOLIC BLOOD PRESSURE: 68 MMHG | HEART RATE: 81 BPM | SYSTOLIC BLOOD PRESSURE: 128 MMHG | BODY MASS INDEX: 23.61 KG/M2 | OXYGEN SATURATION: 97 % | HEIGHT: 62 IN | TEMPERATURE: 98 F

## 2019-10-09 DIAGNOSIS — R49.0 HOARSENESS: ICD-10-CM

## 2019-10-09 DIAGNOSIS — K21.9 GASTROESOPHAGEAL REFLUX DISEASE, ESOPHAGITIS PRESENCE NOT SPECIFIED: ICD-10-CM

## 2019-10-09 DIAGNOSIS — R05.9 COUGH: Primary | ICD-10-CM

## 2019-10-09 DIAGNOSIS — E78.2 MIXED HYPERLIPIDEMIA: ICD-10-CM

## 2019-10-09 DIAGNOSIS — R05.9 COUGH: ICD-10-CM

## 2019-10-09 DIAGNOSIS — R06.09 DYSPNEA ON EXERTION: ICD-10-CM

## 2019-10-09 DIAGNOSIS — I10 ESSENTIAL HYPERTENSION: ICD-10-CM

## 2019-10-09 PROCEDURE — 71046 X-RAY EXAM CHEST 2 VIEWS: CPT | Mod: TC,FY,PO

## 2019-10-09 PROCEDURE — 99214 PR OFFICE/OUTPT VISIT, EST, LEVL IV, 30-39 MIN: ICD-10-PCS | Mod: S$PBB,,, | Performed by: FAMILY MEDICINE

## 2019-10-09 PROCEDURE — 90662 IIV NO PRSV INCREASED AG IM: CPT | Mod: PBBFAC,PO

## 2019-10-09 PROCEDURE — 71046 X-RAY EXAM CHEST 2 VIEWS: CPT | Mod: 26,,, | Performed by: RADIOLOGY

## 2019-10-09 PROCEDURE — 71046 XR CHEST PA AND LATERAL: ICD-10-PCS | Mod: 26,,, | Performed by: RADIOLOGY

## 2019-10-09 PROCEDURE — 99999 PR PBB SHADOW E&M-EST. PATIENT-LVL III: ICD-10-PCS | Mod: PBBFAC,,, | Performed by: FAMILY MEDICINE

## 2019-10-09 PROCEDURE — 99213 OFFICE O/P EST LOW 20 MIN: CPT | Mod: PBBFAC,PO,25 | Performed by: FAMILY MEDICINE

## 2019-10-09 PROCEDURE — 99214 OFFICE O/P EST MOD 30 MIN: CPT | Mod: S$PBB,,, | Performed by: FAMILY MEDICINE

## 2019-10-09 PROCEDURE — 99999 PR PBB SHADOW E&M-EST. PATIENT-LVL III: CPT | Mod: PBBFAC,,, | Performed by: FAMILY MEDICINE

## 2019-10-09 RX ORDER — PRAVASTATIN SODIUM 40 MG/1
40 TABLET ORAL NIGHTLY
Qty: 90 TABLET | Refills: 3 | Status: SHIPPED | OUTPATIENT
Start: 2019-10-09 | End: 2020-11-17 | Stop reason: SDUPTHER

## 2019-10-09 RX ORDER — FLUTICASONE PROPIONATE 50 MCG
2 SPRAY, SUSPENSION (ML) NASAL DAILY
Qty: 16 G | Refills: 11 | Status: SHIPPED | OUTPATIENT
Start: 2019-10-09 | End: 2020-01-07

## 2019-10-09 RX ORDER — LOSARTAN POTASSIUM 25 MG/1
TABLET ORAL
Qty: 90 TABLET | Refills: 3 | Status: SHIPPED | OUTPATIENT
Start: 2019-10-09 | End: 2019-12-19

## 2019-10-09 NOTE — PROGRESS NOTES
"Subjective:       Patient ID: Muna Cedillo is a 85 y.o. female.    Chief Complaint: Cough (2 months); Chest Congestion; and Shortness of Breath    Pt is known to me.  The pt was seen at Urgent care about 6 weeks ago and was diagnosed with bronchitis.  She took 2 rounds of steroids, antibiotics and cough medicine.  She was also has been using a nebulizer with albuterol which seemed to help with the cough.  She is back on the Xyzal.  She is now short of breath with exertion.  She also still has a dry cough.  She feels something in her throat.  The urgent care doctor made her an appointment with Dr. Manzo.    Review of Systems   Constitutional: Negative for activity change, appetite change, fatigue and unexpected weight change.   HENT: Positive for postnasal drip and trouble swallowing (has trouble with pills and some foods).    Eyes: Negative for visual disturbance.   Respiratory: Positive for cough and shortness of breath. Negative for chest tightness.    Cardiovascular: Negative for chest pain, palpitations and leg swelling.   Gastrointestinal: Negative for abdominal pain, constipation, diarrhea, nausea and vomiting.        Heartburn   Endocrine: Negative for cold intolerance, heat intolerance and polyuria.   Genitourinary: Negative for decreased urine volume and dysuria.   Musculoskeletal: Negative for arthralgias and back pain.   Skin: Negative for rash.   Neurological: Negative for numbness and headaches.       Objective:       Vitals:    10/09/19 0947   BP: 128/68   BP Location: Left arm   Patient Position: Sitting   BP Method: Medium (Manual)   Pulse: 81   Temp: 97.8 °F (36.6 °C)   TempSrc: Oral   SpO2: 97%   Weight: 58.2 kg (128 lb 4.9 oz)   Height: 5' 2" (1.575 m)     Physical Exam   Constitutional: She is oriented to person, place, and time. She appears well-developed and well-nourished.   HENT:   Head: Normocephalic.   Much post nasal drainage   Eyes: Pupils are equal, round, and reactive to light. " Conjunctivae and EOM are normal.   Neck: Normal range of motion. Neck supple. No thyromegaly present.   Cardiovascular: Normal rate, regular rhythm, normal heart sounds and intact distal pulses.   No swelling of legs   Pulmonary/Chest: Effort normal and breath sounds normal.   Abdominal: Soft. Bowel sounds are normal. There is no tenderness.   Musculoskeletal: Normal range of motion. She exhibits no tenderness or deformity.   Lymphadenopathy:     She has no cervical adenopathy.   Neurological: She is alert and oriented to person, place, and time. She displays normal reflexes. No cranial nerve deficit. She exhibits normal muscle tone. Coordination normal.   Skin: Skin is warm and dry.   Psychiatric: She has a normal mood and affect. Her behavior is normal.       Assessment:       1. Cough    2. Hoarseness    3. Dyspnea on exertion    4. Gastroesophageal reflux disease, esophagitis presence not specified    5. Essential hypertension    6. Mixed hyperlipidemia        Plan:       Muna was seen today for cough, chest congestion and shortness of breath.    Diagnoses and all orders for this visit:    Cough  -     X-Ray Chest PA And Lateral; Future  -     Ambulatory consult to ENT  -     fluticasone propionate (FLONASE) 50 mcg/actuation nasal spray; 2 sprays (100 mcg total) by Each Nostril route once daily.    Hoarseness  -     Ambulatory consult to ENT  -     fluticasone propionate (FLONASE) 50 mcg/actuation nasal spray; 2 sprays (100 mcg total) by Each Nostril route once daily.    Dyspnea on exertion  -     X-Ray Chest PA And Lateral; Future    Gastroesophageal reflux disease, esophagitis presence not specified    Essential hypertension  -     losartan (COZAAR) 25 MG tablet; TAKE 1 TABLET(25 MG) BY MOUTH EVERY DAY    Mixed hyperlipidemia  -     pravastatin (PRAVACHOL) 40 MG tablet; Take 1 tablet (40 mg total) by mouth every evening.      During this visit, I reviewed the pt's history, medications, allergies, and problem  list.

## 2019-10-16 ENCOUNTER — OFFICE VISIT (OUTPATIENT)
Dept: OTOLARYNGOLOGY | Facility: CLINIC | Age: 84
End: 2019-10-16
Payer: MEDICARE

## 2019-10-16 VITALS — BODY MASS INDEX: 23.37 KG/M2 | WEIGHT: 127 LBS | HEIGHT: 62 IN

## 2019-10-16 DIAGNOSIS — K21.9 LPRD (LARYNGOPHARYNGEAL REFLUX DISEASE): Primary | ICD-10-CM

## 2019-10-16 DIAGNOSIS — R09.89 CHOKING SENSATION: ICD-10-CM

## 2019-10-16 DIAGNOSIS — R09.89 CHRONIC THROAT CLEARING: ICD-10-CM

## 2019-10-16 DIAGNOSIS — R07.0 THROAT DISCOMFORT: ICD-10-CM

## 2019-10-16 DIAGNOSIS — R13.10 DYSPHAGIA, UNSPECIFIED TYPE: ICD-10-CM

## 2019-10-16 DIAGNOSIS — R09.A2 GLOBUS SENSATION: ICD-10-CM

## 2019-10-16 DIAGNOSIS — R49.0 DYSPHONIA: ICD-10-CM

## 2019-10-16 DIAGNOSIS — R05.3 CHRONIC COUGH: ICD-10-CM

## 2019-10-16 PROCEDURE — 99999 PR PBB SHADOW E&M-EST. PATIENT-LVL IV: ICD-10-PCS | Mod: PBBFAC,,, | Performed by: NURSE PRACTITIONER

## 2019-10-16 PROCEDURE — 31575 PR LARYNGOSCOPY, FLEXIBLE; DIAGNOSTIC: ICD-10-PCS | Mod: S$PBB,ICN,, | Performed by: NURSE PRACTITIONER

## 2019-10-16 PROCEDURE — 31575 DIAGNOSTIC LARYNGOSCOPY: CPT | Mod: S$PBB,ICN,, | Performed by: NURSE PRACTITIONER

## 2019-10-16 PROCEDURE — 99214 PR OFFICE/OUTPT VISIT, EST, LEVL IV, 30-39 MIN: ICD-10-PCS | Mod: 25,S$PBB,ICN, | Performed by: NURSE PRACTITIONER

## 2019-10-16 PROCEDURE — 99214 OFFICE O/P EST MOD 30 MIN: CPT | Mod: PBBFAC,PO | Performed by: NURSE PRACTITIONER

## 2019-10-16 PROCEDURE — 99999 PR PBB SHADOW E&M-EST. PATIENT-LVL IV: CPT | Mod: PBBFAC,,, | Performed by: NURSE PRACTITIONER

## 2019-10-16 PROCEDURE — 31575 DIAGNOSTIC LARYNGOSCOPY: CPT | Mod: PBBFAC,PO | Performed by: NURSE PRACTITIONER

## 2019-10-16 PROCEDURE — 99214 OFFICE O/P EST MOD 30 MIN: CPT | Mod: 25,S$PBB,ICN, | Performed by: NURSE PRACTITIONER

## 2019-10-16 RX ORDER — OMEPRAZOLE 40 MG/1
40 CAPSULE, DELAYED RELEASE ORAL
Qty: 30 CAPSULE | Refills: 11 | Status: SHIPPED | OUTPATIENT
Start: 2019-10-16 | End: 2019-12-19 | Stop reason: ALTCHOICE

## 2019-10-16 NOTE — PATIENT INSTRUCTIONS
Some of the Top Considerations for Chronic Cough:     1. Nasal allergies -- Typical constellation of symptoms seen with nasal allergies: itchy, red, watery eyes; itchy, red, watery nose; excessive sneezing; excessive stuffiness. If this one best describes your current state, then discuss with your primary care provider whether you should see an allergist or take daily allergy medications.     2. Sinus Infection -- Typical constellation of symptoms seen with acute bacterial sinus infection are:  Green-gold, foul-smelling, foul-tasting mucus from nose and throat, inability to breathe through nose, inability to smell or taste well, facial pain and swelling, dental pain, headaches around eyes, sore throat and productive cough. If this one best describes your current state, then let's get sinus imaging to rule out infection.     3.  Silent reflux -- Typical constellation of symptoms seen with silent reflux: post-nasal drip sensation with absence of significant runny nose or nasal congestion, sensation of thick or too much mucus in the back of throat, raspy voice, frequent throat clearing, lump in the back of throat, frequent sore throats. If this one best describes your current state, discuss with your primary care provider whether you should see a gastroenterologist or take daily reflux medications. Your GI doctor may want to do an Upper GI or obtain a barium swallow or pH monitoring test.     4. Asthma/Pulmonary (Lung) issue -- Typical constellation of symptoms: wheezing, shortness of breath. Discuss with your primary care provider whether you should see a pulmonologist (lung specialist) and have Pulmonary Function Testing (PFTs) done.         LARYNGOPHARYNGEAL REFLUX  (SILENT OR ATYPICAL REFLUX)    If you have any of the following symptoms you may have laryngopharyngeal reflux (LPR):  hoarseness, thick or too much mucus, chronic throat pain/irritation, chronic throat clearing, chronic cough, especially cough that wake  "you up from sleep, chronic "postnasal drip" without the need to blow your nose.     Many people with LPR do not have symptoms of heartburn. Compared to the esophagus, the voice box and the back of the throat are significantly more sensitive to the effects of acid on surrounding tissue. Acid passing quickly through the esophagus does not have a chance to irritate the area for too long.  However acid that pools in the throat or voice box can cause prolonged irritation resulting in the symptoms of LPR. In patients known to have LPR, 71% reported hoarseness, 51% reported chronic cough, 47% reported sensation of thickness or lump in the back of the throat, 42% reported chronic throat clearing, and 35% reported trouble swallowing.     Another major symptom of LPR is "postnasal drip."  Patients are often told symptoms are due to abnormal nasal drainage or sinus infection; however this is rarely the cause of chronic throat irritation. For post nasal drip to cause the complaints described, signs and symptoms of an active nasal infection should be present.     Treatments for LPR include:  postural changes, weight reduction, diet modification, medication to reduce stomach acid and promote normal motility, and surgery to prevent reflux. Most patients will begin to notice some relief in her symptoms about 2 weeks after starting the medication; however it is generally recommended the medication should be continued for 2 months. If the symptoms completely resolve, the medication can then be tapered.  Some people will remain symptom free while others may have relapses which required treatment again.    Things you can do to prevent reflux include:  Do not smoke.  Smoking will cause reflux.  Avoid tight fitting clothes or belts around the waist.  Avoid vigorous exercise at least 2 hours before bedtime. Avoid eating at least 2 hours prior to bedtime.  In fact avoid eating your largest meal at night.  Weight loss.  For patient's with " "recent weight gain, shedding a few pounds is all that is required to improve reflux.  Avoid caffeine, cola beverages, citrus beverages, mints, alcoholic beverages, particularly at night, cheese, fried foods, spicy foods, eggs, and chocolate.  Sleep with the head of bed elevated at least 6 inches ("MedCline" wedge pillow).    Recommendations:    Take Nexium or Prilosec (PPI) every morning on an empty stomach (30-60 minutes before eating) 40 MG.   At bedtime take Zantac (H2-blocker) 300 mg.    After 4-8 weeks, with significant symptomatic improvement, you may begin weaning your reflux medications down:  Nexium or Prilosec 40 mg --> to 20 mg (over-the-counter strength).  Zantac 300 mg --> to 150 mg (over-the-counter stength).  Then continue to wean as symptoms allow.    See a Gastro doctor (GI) for refractory symptoms and continued management.        How Acid Reflux Affects Your Throat    Do you have to clear your throat or cough often? Are you hoarse? Do you have trouble swallowing? If you have these or other throat symptoms, you may have acid reflux. This occurs when stomach acid flows back up and irritates your throat.  Why you have throat symptoms  There are muscles (esophageal sphincters) at both ends of the tube that carries food to your stomach (the esophagus). These muscles relax to let food pass. Then they tighten to keep stomach acid down. When the lower esophageal sphincter (LES) doesnt tighten enough, acid can flow back (reflux) from your stomach into your esophagus. This may cause heartburn. In some cases the upper esophageal sphincter (UES) also doesnt work well. Then acid can travel higher and enter your throat (pharynx). In many cases, this causes throat symptoms.  Common throat symptoms  · Need to clear your throat often  · Feeling like youre choking  · Long-term (chronic) cough  · Hoarseness  · Trouble swallowing  · Feel like you have a lump in your throat  · Sour or acid taste  · Sore throat that " keeps coming back   Date Last Reviewed: 7/1/2016  © 9065-5727 [employer] [place]. [address] [place] [address], [place] [address]. All rights reserved. This information is not intended as a substitute for professional medical care. Always follow your healthcare professional's instructions.

## 2019-10-16 NOTE — LETTER
October 16, 2019      GABRIEL Franco MD  [address] [facility] [address] [place] [address]           [facility]  [address] [place] [address]  Phone: [phone]  Fax: [phone]          Patient: Muna Cedillo   MR Number: [id number]   YOB: 1934   Date of Visit: 10/16/2019       Dear Dr. GABRIEL Franco:    Thank you for referring Muna Cedillo to me for evaluation. Attached you will find relevant portions of my assessment and plan of care.    If you have questions, please do not hesitate to call me. I look forward to following uMna Cedillo along with you.    Sincerely,    Leidy Chicas NP    Enclosure  CC:  No Recipients    If you would like to receive this communication electronically, please contact [email or web address] or [phone] to request more information on [employer] [facility] Link access.    For providers and/or their staff who would like to refer a patient to [facility], please contact us through our one-stop-shop provider referral line, [facility], at [phone].    If you feel you have received this communication in error or would no longer like to receive these types of communications, please e-mail [email or web address]

## 2019-12-19 ENCOUNTER — OFFICE VISIT (OUTPATIENT)
Dept: GASTROENTEROLOGY | Facility: CLINIC | Age: 84
End: 2019-12-19
Payer: MEDICARE

## 2019-12-19 VITALS
DIASTOLIC BLOOD PRESSURE: 77 MMHG | RESPIRATION RATE: 19 BRPM | BODY MASS INDEX: 23.4 KG/M2 | HEIGHT: 62 IN | HEART RATE: 92 BPM | SYSTOLIC BLOOD PRESSURE: 143 MMHG | WEIGHT: 127.19 LBS

## 2019-12-19 DIAGNOSIS — Z87.19 HISTORY OF GASTROESOPHAGEAL REFLUX (GERD): ICD-10-CM

## 2019-12-19 DIAGNOSIS — R74.01 ELEVATED AST (SGOT): ICD-10-CM

## 2019-12-19 DIAGNOSIS — R09.A2 GLOBUS SENSATION: ICD-10-CM

## 2019-12-19 DIAGNOSIS — R13.14 PHARYNGOESOPHAGEAL DYSPHAGIA: Primary | ICD-10-CM

## 2019-12-19 DIAGNOSIS — Z79.01 ANTICOAGULANT LONG-TERM USE: ICD-10-CM

## 2019-12-19 DIAGNOSIS — Z86.2 HISTORY OF ANEMIA: ICD-10-CM

## 2019-12-19 DIAGNOSIS — Z95.5 HISTORY OF CORONARY ARTERY STENT PLACEMENT: ICD-10-CM

## 2019-12-19 PROCEDURE — 99999 PR PBB SHADOW E&M-EST. PATIENT-LVL V: CPT | Mod: PBBFAC,,, | Performed by: NURSE PRACTITIONER

## 2019-12-19 PROCEDURE — 1126F PR PAIN SEVERITY QUANTIFIED, NO PAIN PRESENT: ICD-10-PCS | Mod: ,,, | Performed by: NURSE PRACTITIONER

## 2019-12-19 PROCEDURE — 1159F PR MEDICATION LIST DOCUMENTED IN MEDICAL RECORD: ICD-10-PCS | Mod: ,,, | Performed by: NURSE PRACTITIONER

## 2019-12-19 PROCEDURE — 1159F MED LIST DOCD IN RCRD: CPT | Mod: ,,, | Performed by: NURSE PRACTITIONER

## 2019-12-19 PROCEDURE — 1126F AMNT PAIN NOTED NONE PRSNT: CPT | Mod: ,,, | Performed by: NURSE PRACTITIONER

## 2019-12-19 PROCEDURE — 99214 PR OFFICE/OUTPT VISIT, EST, LEVL IV, 30-39 MIN: ICD-10-PCS | Mod: S$PBB,,, | Performed by: NURSE PRACTITIONER

## 2019-12-19 PROCEDURE — 99215 OFFICE O/P EST HI 40 MIN: CPT | Mod: PBBFAC,PO | Performed by: NURSE PRACTITIONER

## 2019-12-19 PROCEDURE — 99214 OFFICE O/P EST MOD 30 MIN: CPT | Mod: S$PBB,,, | Performed by: NURSE PRACTITIONER

## 2019-12-19 PROCEDURE — 99999 PR PBB SHADOW E&M-EST. PATIENT-LVL V: ICD-10-PCS | Mod: PBBFAC,,, | Performed by: NURSE PRACTITIONER

## 2019-12-19 RX ORDER — CLOPIDOGREL BISULFATE 75 MG/1
TABLET ORAL
COMMUNITY
End: 2021-05-28 | Stop reason: SDUPTHER

## 2019-12-19 RX ORDER — PANTOPRAZOLE SODIUM 20 MG/1
20 TABLET, DELAYED RELEASE ORAL
Qty: 30 TABLET | Refills: 3 | Status: SHIPPED | OUTPATIENT
Start: 2019-12-19 | End: 2020-04-22

## 2019-12-19 RX ORDER — VALSARTAN 80 MG/1
TABLET ORAL
COMMUNITY
Start: 2019-12-02 | End: 2021-01-13

## 2019-12-19 NOTE — LETTER
December 26, 2019      GABRIEL Franco MD  [address] [facility] [address] [place] [address]           [facility]  [address] [place] [address]  Phone: [phone]          Patient: Muna Cedillo   MR Number: [id number]   YOB: 1934   Date of Visit: 12/19/2019       Dear Dr. GABRIEL Franco:    Thank you for referring Muna Cedillo to me for evaluation. Attached you will find relevant portions of my assessment and plan of care.    If you have questions, please do not hesitate to call me. I look forward to following Muna Cedillo along with you.    Sincerely,    Emely Scott, [facility]    Enclosure  CC:  No Recipients    If you would like to receive this communication electronically, please contact [email or web address] or [phone] to request more information on [employer] Link access.    For providers and/or their staff who would like to refer a patient to [facility], please contact us through our one-stop-shop provider referral line, [facility], at [phone].    If you feel you have received this communication in error or would no longer like to receive these types of communications, please e-mail [email or web address]

## 2019-12-19 NOTE — PROGRESS NOTES
Subjective:       Patient ID: Muna Cedillo is a 85 y.o. female Body mass index is 23.27 kg/m².    Chief Complaint: Dysphagia    This patient is new to me.  Referring Provider: Dr. GABRIEL Franco     Patient is here with one of her daughters, whom assisted with history.    Gastroesophageal Reflux   She complains of dysphagia (CHIEF COMPLAINT: started in 11/2019 after she had a mini-stroke, had coronary heart stents placed in 11/2019; occurs with pills and some foods, such as meat; improving since seeing speech therapist, whom told her to get EGD to have esophagus stretched) and globus sensation. She reports no abdominal pain, no belching, no chest pain, no choking (denies any choking, dysphagia relieved with drinking water), no coughing, no heartburn (has resolved since on medications), no hoarse voice, no nausea or no sore throat. Episode onset: saw ENT 10/2019 for cough and hoarse voice; placed on reflux medications. Pertinent negatives include no fatigue, melena or weight loss. She has tried a PPI and a histamine-2 antagonist (prilosec 20 mg daily, zantac 300 mg nightly) for the symptoms. Past procedures do not include an EGD. saw ENT and told had signs of LPRD.     Review of Systems   Constitutional: Negative for appetite change, chills, fatigue, fever and weight loss.   HENT: Positive for trouble swallowing (patient reports speech therapist has only been working with her for her speech and denies any testing for swallowing). Negative for congestion, hoarse voice and sore throat.    Respiratory: Negative for cough, choking (denies any choking, dysphagia relieved with drinking water) and shortness of breath.    Cardiovascular: Negative for chest pain.   Gastrointestinal: Positive for dysphagia (CHIEF COMPLAINT: started in 11/2019 after she had a mini-stroke, had coronary heart stents placed in 11/2019; occurs with pills and some foods, such as meat; improving since seeing speech therapist, whom told her to get  EGD to have esophagus stretched). Negative for abdominal pain, anal bleeding, blood in stool, constipation, diarrhea, heartburn (has resolved since on medications), melena, nausea, rectal pain and vomiting.   Genitourinary: Negative for difficulty urinating, dysuria and flank pain.   Neurological: Negative for weakness.       No LMP recorded. Patient has had a hysterectomy.  Past Medical History:   Diagnosis Date    Arthritis     back pain    Breast cancer 1998    was treated with radiation    Diverticulosis     Hypertension     Shingles     Stroke ~11/2019     Past Surgical History:   Procedure Laterality Date    BREAST BIOPSY Right     malignant    BREAST LUMPECTOMY Right     malignant    COLONOSCOPY  05/05/2014    Dr. Ponce, in media: hemorrhoids, diverticulosis, no repeat colonoscopy for screening recommended    CORONARY STENT PLACEMENT  11/2019    HYSTERECTOMY      partial hysterectomy, has both ovaries     Family History   Problem Relation Age of Onset    Colon cancer Mother         unsure of age of diagnosis    Cancer Brother         brain tumor    Multiple sclerosis Brother     Crohn's disease Neg Hx     Ulcerative colitis Neg Hx     Stomach cancer Neg Hx     Esophageal cancer Neg Hx      Wt Readings from Last 10 Encounters:   12/19/19 57.7 kg (127 lb 3.3 oz)   10/16/19 57.6 kg (126 lb 15.8 oz)   10/09/19 58.2 kg (128 lb 4.9 oz)   08/20/19 57.1 kg (125 lb 14.1 oz)   04/23/19 56.7 kg (125 lb)   02/20/19 57 kg (125 lb 10.6 oz)   10/06/18 53.5 kg (118 lb)   08/20/18 55.5 kg (122 lb 5.7 oz)   02/14/18 55.3 kg (122 lb)   10/16/17 55.5 kg (122 lb 5.7 oz)     Lab Results   Component Value Date    WBC 5.80 10/07/2018    HGB 10.8 (L) 10/07/2018    HCT 33.0 (L) 10/07/2018    MCV 92 10/07/2018     10/07/2018     CMP  Sodium   Date Value Ref Range Status   10/07/2018 140 136 - 145 mmol/L Final     Potassium   Date Value Ref Range Status   10/07/2018 4.2 3.5 - 5.1 mmol/L Final     Chloride    Date Value Ref Range Status   10/07/2018 109 95 - 110 mmol/L Final     CO2   Date Value Ref Range Status   10/07/2018 27 22 - 31 mmol/L Final     Glucose   Date Value Ref Range Status   10/07/2018 89 70 - 110 mg/dL Final     Comment:     The ADA recommends the following guidelines for fasting glucose:  Normal:       less than 100 mg/dL  Prediabetes:  100 mg/dL to 125 mg/dL  Diabetes:     126 mg/dL or higher       BUN, Bld   Date Value Ref Range Status   10/07/2018 24 (H) 7 - 18 mg/dL Final     Creatinine   Date Value Ref Range Status   10/07/2018 0.95 0.50 - 1.40 mg/dL Final     Calcium   Date Value Ref Range Status   10/07/2018 8.7 8.4 - 10.2 mg/dL Final     Total Protein   Date Value Ref Range Status   10/06/2018 7.1 6.0 - 8.4 g/dL Final     Albumin   Date Value Ref Range Status   10/06/2018 3.7 3.5 - 5.2 g/dL Final     Total Bilirubin   Date Value Ref Range Status   10/06/2018 1.0 0.2 - 1.3 mg/dL Final     Alkaline Phosphatase   Date Value Ref Range Status   10/06/2018 89 38 - 145 U/L Final     AST   Date Value Ref Range Status   10/06/2018 48 (H) 14 - 36 U/L Final     ALT   Date Value Ref Range Status   10/06/2018 44 10 - 44 U/L Final     Anion Gap   Date Value Ref Range Status   10/07/2018 4 (L) 8 - 16 mmol/L Final     eGFR if    Date Value Ref Range Status   10/07/2018 >60 >60 mL/min/1.73 m^2 Final     eGFR if non    Date Value Ref Range Status   10/07/2018 55 (A) >60 mL/min/1.73 m^2 Final     Comment:     Calculation used to obtain the estimated glomerular filtration  rate (eGFR) is the CKD-EPI equation.        Lab Results   Component Value Date    TSH 0.878 10/07/2018     Reviewed prior medical records including radiology report of 10/9/19 chest x-ray.    Objective:      Physical Exam   Constitutional: She is oriented to person, place, and time. She appears well-developed and well-nourished. No distress.   HENT:   Mouth/Throat: Oropharynx is clear and moist and mucous  membranes are normal. No oral lesions. No oropharyngeal exudate.   Eyes: Pupils are equal, round, and reactive to light. Conjunctivae are normal. No scleral icterus.   Pulmonary/Chest: Effort normal and breath sounds normal. No respiratory distress. She has no wheezes.   Abdominal: Soft. Normal appearance and bowel sounds are normal. She exhibits no distension, no abdominal bruit and no mass. There is no tenderness. There is no rigidity, no rebound, no guarding, no tenderness at McBurney's point and negative Voss's sign.   Neurological: She is alert and oriented to person, place, and time.   Skin: Skin is warm and dry. No rash noted. She is not diaphoretic. No erythema. No pallor.   Non-jaundiced   Psychiatric: She has a normal mood and affect. Her behavior is normal. Judgment and thought content normal.   Nursing note and vitals reviewed.      Assessment:       1. Pharyngoesophageal dysphagia    2. Anticoagulant long-term use    3. History of coronary artery stent placement    4. History of anemia    5. Elevated AST (SGOT)    6. Globus sensation    7. History of gastroesophageal reflux (GERD)        Plan:       Pharyngoesophageal dysphagia  -  START   pantoprazole (PROTONIX) 20 MG tablet; Take 1 tablet (20 mg total) by mouth before breakfast.  Dispense: 30 tablet; Refill: 3  -     FL Upper GI Without [employer] Esophagram; Future; Expected date: 12/19/2019  -     Fl Modified Barium Swallow Speech; Future; Expected date: 12/19/2019  - educated patient to eat smaller more frequent meals and to eat slowly and advised to eat a soft diet.  - possible UGI/esophagram/esophageal manometry if symptoms persist    Anticoagulant long-term use  -   START  pantoprazole (PROTONIX) 20 MG tablet; Take 1 tablet (20 mg total) by mouth before breakfast.  Dispense: 30 tablet; Refill: 3  - DISCONTINUE Prilosec due to possible drug-to-drug interaction with Plavix    History of coronary artery stent placement  - discussed with patient and  family member that due to recent heart stent placement, patient's cardiologist will not likely allow her to hold anticoagulants at this time. Patient and family member verbalized understanding and agreed with having swallowing studies done at this time and if symptoms persist, patient will follow-up and we can discuss about scheduling EGD in the future.    History of anemia  Recommend follow-up with Primary Care Provider for continued evaluation and management.    Elevated AST (SGOT)  - minimize/avoid alcohol and tylenol products, & follow-up with PCP for continued evaluation and management; if specialist is needed, recommend seeing hepatology.    Globus sensation  -     FL Upper GI Without [employer] Esophagram; Future; Expected date: 12/19/2019  -     Fl Modified Barium Swallow Speech; Future; Expected date: 12/19/2019  Recommend follow-up with ENT for continued evaluation and management.    History of gastroesophageal reflux (GERD)  -     FL Upper GI Without [employer] Esophagram; Future; Expected date: 12/19/2019  -     Fl Modified Barium Swallow Speech; Future; Expected date: 12/19/2019  -  START   pantoprazole (PROTONIX) 20 MG tablet; Take 1 tablet (20 mg total) by mouth before breakfast.  Dispense: 30 tablet; Refill: 3  - CONTINUE ZANTAC 300 MG NIGHTLY AS DIRECTED  - discussed with patient and family member that due to recent heart stent placement, patient's cardiologist will not likely allow her to hold anticoagulants at this time. Patient and family member verbalized understanding and agreed with having swallowing studies done at this time and if symptoms persist, patient will follow-up and we can discuss about scheduling EGD in the future.  - avoid/minimize use of NSAIDs- since they can cause GI upset, bleeding and/or ulcers. If NSAID must be taken, recommend take with food.    Follow up in about 1 month (around 1/19/2020), or if symptoms worsen or fail to improve.      If no improvement in symptoms or symptoms  worsen, call/follow-up at clinic or go to ER.

## 2019-12-19 NOTE — PATIENT INSTRUCTIONS
Discharge Instructions: Eating a Soft Diet  You have been prescribed a soft diet (also called gastrointestinal soft diet or bland diet). This reduces the amount of work your digestive tract has to do. It also reduces the chance that your digestive tract will be irritated by the food you eat. A soft diet is prescribed for people with digestive problems. The diet consists of foods that are tender, mildly seasoned, and easy to digest. While on this diet, you should not eat fried or spicy foods, or raw fruits and vegetables. Also avoid alcoholic beverages.  General guidelines  · Eat in a calm, relaxed atmosphere. How you eat may be as important as what you eat. Dont rush while eating. Chew your food slowly and thoroughly, and swallow slowly.  · Eat small frequent meals throughout the day, but dont eat within 2 hours of bedtime.  · Avoid any foods that cause discomfort.  · Dont use NSAIDs (nonsteroidal anti-inflammatory drugs), such as aspirin, and ibuprofen. Also avoid medicine that contain aspirin. NSAIDs can cause ulcers and delay or prevent ulcer healing.  · Use antacids as needed, but keep in mind that magnesium-containing antacids may cause diarrhea.  Foods to eat  · Cream of wheat and cream of rice  · Cooked white rice  · Mashed potatoes, and boiled potatoes without skin  · Plain pasta and noodles  · Plain white crackers (such as no-salt soda crackers)  · White bread  · Applesauce  · Cooked fruits without skins or seeds  · Mild juices, such as apple and grape  · Bananas  · Cooked or mashed vegetables without stems and seeds  ? Carrots  ? Summer squash (zucchini, yellow squash)  ? Winter squash (acorn, butternut, spaghetti squash)  · Cottage cheese  · Mild hard or soft cheeses  · Custard  · Yogurt without seeds or nuts  · Milk (you may need lactose-free milk)  · Ice cream without seeds or nuts  · Smooth peanut butter  · Eggs  · Fish, turkey, chicken, or other meat that is not tough or stringy  · Tofu  Foods to  avoid  · Nuts and seeds  · Snack foods, such as the following:  ? Chocolate-containing snacks, candy, pastries, or cakes.  ? Potato chips (plain, barbecued, or other flavors)  ? Taco chips or nachos  ? Corn chips  ? Popcorn, popcorn cakes, or rice cakes  ? Crackers with nuts, seeds, or spicy seasonings  ? French fries  · Fried or greasy foods  · Whole-grain breads, rolls, and crackers  · Breads and rolls with nuts, seeds, or bran  · Bran and granola cereals  · Berries with seeds, such as strawberries, raspberries, and blackberries  · Acidic fruits, such as oranges, grapefruits, my, limes, and pineapples  · Raw vegetables  · Mild or hot peppers  · Sauerkraut and pickled vegetables  · Tomatoes or tomato products, such as tomato paste, tomato sauce, and tomato juice  · Barbecue sauce  · Spicy or flavored cheeses, such as jalapeño and black pepper cheese  · Crunchy peanut butter  · Dried cooked beans, such as steiner, kidney, or navy beans  · The following meats:  ? Fried or greasy meats  ? Processed, spicy meats, such as sausage, guerrero, ham, and lunch meats  ? Ribs and other meats with barbecue sauce  ? Tough or stringy meats, such as corned beef or beef jerky  Fluids to avoid  · Alcoholic beverages  · Coffee and regular teas  · Crsytal and other drinks with caffeine  · Cranberry, orange, pineapple, and grapefruit juice  · Lemonade  · Vegetable juice  · Whole milk, if you are lactose intolerant  Follow-up  Make a follow-up appointment with a dietitian as directed by our staff.  Date Last Reviewed: 6/21/2015  © 8009-6551 [employer]. [address] [place] [address], [place] [address]. All rights reserved. This information is not intended as a substitute for professional medical care. Always follow your healthcare professional's instructions.          GERD (Adult)    The esophagus is a tube that carries food from the mouth to the stomach. A valve at the lower end of the esophagus prevents stomach acid from flowing  "upward. When this valve doesn't work properly, stomach contents may repeatedly flow back up (reflux) into the esophagus. This is called gastroesophageal reflux disease (GERD). GERD can irritate the esophagus. It can cause problems with swallowing or breathing. In severe cases, GERD can cause recurrent pneumonia or other serious problems.  Symptoms of reflux include burning, pressure or sharp pain in the upper abdomen or mid to lower chest. The pain can spread to the neck, back, or shoulder. There may be belching, an acid taste in the back of the throat, chronic cough, or sore throat or hoarseness. GERD symptoms often occur during the day after a big meal. They can also occur at night when lying down.   Home care  Lifestyle changes can help reduce symptoms. If needed, medicines may be prescribed. Symptoms often improve with treatment, but if treatment is stopped, the symptoms often return after a few months. So most persons with GERD will need to continue treatment.  Lifestyle changes  · Limit or avoid fatty, fried, and spicy foods, as well as coffee, chocolate, mint, and foods with high acid content such as tomatoes and citrus fruit and juices (orange, grapefruit, lemon).  · Dont eat large meals, especially at night. Frequent, smaller meals are best. Do not lie down right after eating. And dont eat anything 3 hours before going to bed.  · Avoid drinking alcohol and smoking. As much as possible, stay away from second hand smoke.  · If you are overweight, losing weight will reduce symptoms.   · Avoid wearing tight clothing around your stomach area.  · If your symptoms occur during sleep, use a foam wedge to elevate your upper body (not just your head.) Or, place 4" blocks under the head of your bed.  Medicines  If needed, medicines can help relieve the symptoms of GERD and prevent damage to the esophagus. Discuss a medicine plan with your healthcare provider. This may include one or more of the following " medicines:  · Antacids to help neutralize the normal acids in your stomach.  · Acid blockers (H2 blockers) to decrease acid production.  · Acid inhibitors (PPIs) to decrease acid production in a different way than the blockers. They may work better, but can take a little longer to take effect.  Take an antacid 30-60 minutes after eating and at bedtime, but not at the same time as an acid blocker.  Try not to take medicines such as ibuprofen and aspirin. If you are taking aspirin for your heart or other medical reasons, talk to your healthcare provider about stopping it.  Follow-up care  Follow up with your healthcare provider or as advised by our staff.  When to seek medical advice  Call your healthcare provider if any of the following occur:  · Stomach pain gets worse or moves to the lower right abdomen (appendix area)  · Chest pain appears or gets worse, or spreads to the back, neck, shoulder, or arm  · Frequent vomiting (cant keep down liquids)  · Blood in the stool or vomit (red or black in color)  · Feeling weak or dizzy  · Fever of 100.4ºF (38ºC) or higher, or as directed by your healthcare provider  Date Last Reviewed: 6/23/2015  © 3846-1167 [employer]. [address] [place] [address], [place] [address]. All rights reserved. This information is not intended as a substitute for professional medical care. Always follow your healthcare professional's instructions.

## 2019-12-27 ENCOUNTER — TELEPHONE (OUTPATIENT)
Dept: GASTROENTEROLOGY | Facility: CLINIC | Age: 84
End: 2019-12-27

## 2019-12-27 ENCOUNTER — HOSPITAL ENCOUNTER (OUTPATIENT)
Dept: RADIOLOGY | Facility: HOSPITAL | Age: 84
Discharge: HOME | End: 2019-12-27
Attending: NURSE PRACTITIONER
Payer: MEDICARE

## 2019-12-27 DIAGNOSIS — R09.A2 GLOBUS SENSATION: ICD-10-CM

## 2019-12-27 DIAGNOSIS — Z87.19 HISTORY OF GASTROESOPHAGEAL REFLUX (GERD): ICD-10-CM

## 2019-12-27 DIAGNOSIS — R13.14 PHARYNGOESOPHAGEAL DYSPHAGIA: ICD-10-CM

## 2019-12-27 PROCEDURE — 74240 X-RAY XM UPR GI TRC 1CNTRST: CPT | Mod: TC,FY,PO

## 2019-12-27 PROCEDURE — A9698 NON-RAD CONTRAST MATERIALNOC: HCPCS | Mod: PO | Performed by: NURSE PRACTITIONER

## 2019-12-27 PROCEDURE — 25500020 PHARM REV CODE 255: Mod: PO | Performed by: NURSE PRACTITIONER

## 2019-12-27 PROCEDURE — 74240 X-RAY XM UPR GI TRC 1CNTRST: CPT | Mod: 26,,, | Performed by: RADIOLOGY

## 2019-12-27 PROCEDURE — 74240 FL UPPER GI W/OUT KUB TO INCLUDE ESOPHAGRAM: ICD-10-PCS | Mod: 26,,, | Performed by: RADIOLOGY

## 2019-12-27 RX ADMIN — BARIUM SULFATE 135 ML: 980 POWDER, FOR SUSPENSION ORAL at 11:12

## 2019-12-27 NOTE — TELEPHONE ENCOUNTER
"Please call to inform & review the results with the patient- radiology report of the UGI with esophagram showed "Mild gastroesophageal reflux and intermittent tertiary esophageal contractions." (extra contractions/movement of the esophagus, which can be common with aging). No aspiration seen on this study.    Continue with previous recommendations, including GERD recommendations & modified barium swallow study as scheduled.    If no improvement in symptoms or symptoms worsen, call/follow-up at clinic or go to ER.  Please release results to patient's mychart once you have discussed results and recommendations with patient.  Thanks,        "

## 2019-12-27 NOTE — TELEPHONE ENCOUNTER
Spoke with pt and informed of results and recommendations per Jane Scott NP. Pt verbalized understanding.

## 2020-01-03 ENCOUNTER — TELEPHONE (OUTPATIENT)
Dept: GASTROENTEROLOGY | Facility: CLINIC | Age: 85
End: 2020-01-03

## 2020-01-03 NOTE — TELEPHONE ENCOUNTER
Please call to inform & review the results with the patient- radiology report of the modified barium swallow study showed unremarkable findings (no aspiration seen). I have not been able to view speech therapist note since it is not completed yet.    Continue with previous recommendations. If no improvement in symptoms or symptoms worsen, call/follow-up at clinic or go to ER.  Please release results to patient's mychart once you have discussed results and recommendations with patient.  Thanks,

## 2020-01-23 ENCOUNTER — TREATMENT PLANNING (OUTPATIENT)
Dept: GASTROENTEROLOGY | Facility: CLINIC | Age: 85
End: 2020-01-23

## 2020-01-23 NOTE — Clinical Note
Hello,I was looking to view the speech therapy note for the modified barium swallow study that was done on 12/31/2019. I never received it and I have not been able to pull up the note.Thank you,Emely Scott

## 2020-01-23 NOTE — PROGRESS NOTES
The speech therapy note is still not available to view. I forwarded the message to the Speech Therapist.  LEONARD

## 2020-02-06 ENCOUNTER — TELEPHONE (OUTPATIENT)
Dept: GASTROENTEROLOGY | Facility: CLINIC | Age: 85
End: 2020-02-06

## 2020-02-06 DIAGNOSIS — R13.13 PHARYNGEAL DYSPHAGIA: ICD-10-CM

## 2020-02-06 DIAGNOSIS — R93.3 ABNORMAL BARIUM SWALLOW: Primary | ICD-10-CM

## 2020-02-06 DIAGNOSIS — R13.14 PHARYNGOESOPHAGEAL DYSPHAGIA: ICD-10-CM

## 2020-02-06 NOTE — TELEPHONE ENCOUNTER
"Please call to inform & review the results with the patient- speech pathology report of the modified barium swallow study showed "IMPRESSION:  Oral swallow within functional limits. Mild impairment of the pharyngeal swallow."     RECOMMENDATIONS: Continue with speech therapist recommendations: "A regular consistency diet and thin liquids are recommended as well as a secondary dry swallow or liquid wash following solid consistency bolus. The patient may benefit from speech therapy to improve the function of the swallow. "  I placed an referral for speech therapy.    Continue with previous recommendations. If no improvement in symptoms or symptoms worsen, call/follow-up at clinic or go to ER.  Please release results to patient's mychart once you have discussed results and recommendations with patient.  Thanks,        "

## 2020-02-24 NOTE — TELEPHONE ENCOUNTER
Reviewed labs with patient. Patient verbalized understanding.    Patient will call back to schedule Speech Therapy appt

## 2020-04-14 ENCOUNTER — TELEPHONE (OUTPATIENT)
Dept: OTOLARYNGOLOGY | Facility: CLINIC | Age: 85
End: 2020-04-14

## 2020-04-14 NOTE — TELEPHONE ENCOUNTER
Spoke w/ pt daughter who states her mom is currently taking pantoprazole every morning. Informed daughter of recommendations from NP. States she will call to schedule virtual visit if symptoms does not improve.

## 2020-04-14 NOTE — TELEPHONE ENCOUNTER
Zantac (aka ranitidine) has been recently recalled by the FDA. I recommend discontinuing the zantac.  Is patient still taking pantoprazole? If so, then this should cover and should control the reflux. It is actually stronger than zantac. If patient is not taking it or if patient starts experiencing recurrence of reflux symptoms, then we can try famotidine in replacement of the zantac. If patient's symptoms are not controlled, then I also recommend patient schedule a follow-up appointment (virtual visit recommended at this time due to COVID 19 outbreak concerns).  Thanks  LEONARD

## 2020-04-14 NOTE — TELEPHONE ENCOUNTER
----- Message from Ciara  sent at 4/14/2020  1:16 PM CDT -----  Contact: daughter /pt  Type: Needs Medical Advice    Who Called:      Best Call Back Number:     Additional Information: Requesting a call back from Nurse, regarding Rx ranitidine (ZANTAC) 300 MG tablet 30 tablet was recalled and took off shelf pt needs something else call in that will not react with her meds ,please call back with advice

## 2020-04-22 DIAGNOSIS — Z87.19 HISTORY OF GASTROESOPHAGEAL REFLUX (GERD): ICD-10-CM

## 2020-04-22 DIAGNOSIS — Z79.01 ANTICOAGULANT LONG-TERM USE: ICD-10-CM

## 2020-04-22 RX ORDER — PANTOPRAZOLE SODIUM 20 MG/1
TABLET, DELAYED RELEASE ORAL
Qty: 30 TABLET | Refills: 3 | Status: SHIPPED | OUTPATIENT
Start: 2020-04-22 | End: 2020-08-20

## 2020-05-05 ENCOUNTER — PATIENT MESSAGE (OUTPATIENT)
Dept: ADMINISTRATIVE | Facility: HOSPITAL | Age: 85
End: 2020-05-05

## 2020-08-03 ENCOUNTER — PATIENT MESSAGE (OUTPATIENT)
Dept: FAMILY MEDICINE | Facility: CLINIC | Age: 85
End: 2020-08-03

## 2020-08-11 ENCOUNTER — OFFICE VISIT (OUTPATIENT)
Dept: FAMILY MEDICINE | Facility: CLINIC | Age: 85
End: 2020-08-11
Payer: MEDICARE

## 2020-08-11 VITALS
WEIGHT: 109.13 LBS | HEART RATE: 92 BPM | DIASTOLIC BLOOD PRESSURE: 58 MMHG | TEMPERATURE: 98 F | BODY MASS INDEX: 20.08 KG/M2 | SYSTOLIC BLOOD PRESSURE: 118 MMHG | OXYGEN SATURATION: 98 % | HEIGHT: 62 IN

## 2020-08-11 DIAGNOSIS — M21.371 RIGHT FOOT DROP: ICD-10-CM

## 2020-08-11 DIAGNOSIS — I63.319 CEREBROVASCULAR ACCIDENT (CVA) DUE TO THROMBOSIS OF MIDDLE CEREBRAL ARTERY, UNSPECIFIED BLOOD VESSEL LATERALITY: Primary | ICD-10-CM

## 2020-08-11 DIAGNOSIS — R47.01 EXPRESSIVE APHASIA: ICD-10-CM

## 2020-08-11 DIAGNOSIS — I10 ESSENTIAL HYPERTENSION: ICD-10-CM

## 2020-08-11 DIAGNOSIS — E78.2 MIXED HYPERLIPIDEMIA: ICD-10-CM

## 2020-08-11 PROCEDURE — 99999 PR PBB SHADOW E&M-EST. PATIENT-LVL III: CPT | Mod: PBBFAC,,, | Performed by: FAMILY MEDICINE

## 2020-08-11 PROCEDURE — 99213 OFFICE O/P EST LOW 20 MIN: CPT | Mod: PBBFAC,PO | Performed by: FAMILY MEDICINE

## 2020-08-11 PROCEDURE — 99214 PR OFFICE/OUTPT VISIT, EST, LEVL IV, 30-39 MIN: ICD-10-PCS | Mod: S$PBB,,, | Performed by: FAMILY MEDICINE

## 2020-08-11 PROCEDURE — 99214 OFFICE O/P EST MOD 30 MIN: CPT | Mod: S$PBB,,, | Performed by: FAMILY MEDICINE

## 2020-08-11 PROCEDURE — 99999 PR PBB SHADOW E&M-EST. PATIENT-LVL III: ICD-10-PCS | Mod: PBBFAC,,, | Performed by: FAMILY MEDICINE

## 2020-08-11 RX ORDER — ACETAMINOPHEN 325 MG/1
325 TABLET ORAL DAILY PRN
COMMUNITY

## 2020-08-11 RX ORDER — LOSARTAN POTASSIUM 50 MG/1
TABLET ORAL
COMMUNITY
Start: 2020-08-10 | End: 2020-08-11

## 2020-08-11 NOTE — PROGRESS NOTES
"Subjective:       Patient ID: Muna Cedillo is a 86 y.o. female.    Chief Complaint: Dysphagia    Pt is known to me.  The pt had a stroke in 11/2019.  She now is having speech problems (expressive aphasia) and dysphagia.  She gets choked.  She has not had an aspiration pneumonia.  She is back in therapy after COVID stay at home order.  She had declined in her progress.  She has difficulty coughing effectively.  The choking makes her panic.  She can swallow her pills with yogurt.  She has some Thick-It at home.  She has some weakness of her right leg.  She has a right foot drop as a result of the stroke.  She also has some problems at L4-5. She cannot be off the Plavix according to Dr. Carlisle in order to have any procedure.  We reviewed her recent labs.    Review of Systems   Constitutional: Negative for activity change, appetite change, fatigue and unexpected weight change.   HENT: Positive for trouble swallowing.    Eyes: Negative for visual disturbance.   Respiratory: Negative for cough, chest tightness and shortness of breath.    Cardiovascular: Negative for chest pain, palpitations and leg swelling.   Gastrointestinal: Negative for abdominal pain, constipation, diarrhea, nausea and vomiting.   Endocrine: Negative for cold intolerance, heat intolerance and polyuria.   Genitourinary: Negative for decreased urine volume and dysuria.   Musculoskeletal: Positive for back pain and gait problem. Negative for arthralgias.   Skin: Negative for rash.   Neurological: Positive for speech difficulty. Negative for numbness and headaches.   Psychiatric/Behavioral: Negative for sleep disturbance.       Objective:       Vitals:    08/11/20 1404   BP: (!) 118/58   Pulse: 92   Temp: 98 °F (36.7 °C)   TempSrc: Temporal   SpO2: 98%   Weight: 49.5 kg (109 lb 2 oz)   Height: 5' 2.4" (1.585 m)     Physical Exam  Constitutional:       General: She is not in acute distress.     Appearance: She is well-developed. She is not " ill-appearing.      Comments: Pt is thin   HENT:      Head: Normocephalic.   Eyes:      Conjunctiva/sclera: Conjunctivae normal.      Pupils: Pupils are equal, round, and reactive to light.   Neck:      Musculoskeletal: Normal range of motion and neck supple.      Thyroid: No thyromegaly.   Cardiovascular:      Rate and Rhythm: Normal rate and regular rhythm.      Heart sounds: Normal heart sounds.   Pulmonary:      Effort: Pulmonary effort is normal.      Breath sounds: Normal breath sounds.   Abdominal:      General: Bowel sounds are normal.      Palpations: Abdomen is soft.      Tenderness: There is no abdominal tenderness.   Musculoskeletal: Normal range of motion.         General: No tenderness or deformity.   Lymphadenopathy:      Cervical: No cervical adenopathy.   Skin:     General: Skin is warm and dry.   Neurological:      Mental Status: She is alert and oriented to person, place, and time.      Cranial Nerves: No cranial nerve deficit.      Motor: No abnormal muscle tone.      Coordination: Coordination normal.      Deep Tendon Reflexes: Reflexes normal.      Comments: Expressive aphasia, weak right leg, impaired gait.   Psychiatric:         Mood and Affect: Mood normal.         Behavior: Behavior normal.         Thought Content: Thought content normal.         Assessment:       1. Cerebrovascular accident (CVA) due to thrombosis of middle cerebral artery, unspecified blood vessel laterality    2. Essential hypertension    3. Mixed hyperlipidemia    4. Expressive aphasia    5. Right foot drop        Plan:       Muna was seen today for dysphagia.    Diagnoses and all orders for this visit:    Cerebrovascular accident (CVA) due to thrombosis of middle cerebral artery, unspecified blood vessel laterality    Essential hypertension    Mixed hyperlipidemia    Expressive aphasia    Right foot drop    She uses an AFO after the stroke for foot drop.  She also requires a wheelchair due to her right leg weakness  after her stroke. A cane or walker is not feasible at this time due to her weakness and gait abnormality. Without a wheelchair the pt cannot get to the bathroom, kitchen or out of her house.  She is unable to perform basic ADL's of toileting, preparing meals, dressing and moving about her home.  A wheelchair would make these ADL's possible.  Her home is has adequate space between and in rooms to allow for a standard wheelchair.  The pt is willing to use a wheelchair in her home.  She has sufficient upper body strength and endurance to propel the wheelchair for the necessary distances to perform ADL's in a timely fashion.  The pt's daughter is able to help her when needed with the wheelchair.  During this visit, I reviewed the pt's history, medications, allergies, and problem list.

## 2020-08-13 ENCOUNTER — PATIENT MESSAGE (OUTPATIENT)
Dept: FAMILY MEDICINE | Facility: CLINIC | Age: 85
End: 2020-08-13

## 2020-08-13 DIAGNOSIS — M54.50 LUMBAR PAIN: Primary | ICD-10-CM

## 2020-08-13 RX ORDER — ACETAMINOPHEN AND CODEINE PHOSPHATE 120; 12 MG/5ML; MG/5ML
SOLUTION ORAL
Qty: 240 ML | Refills: 1 | Status: SHIPPED | OUTPATIENT
Start: 2020-08-13 | End: 2021-05-28

## 2020-08-13 NOTE — TELEPHONE ENCOUNTER
See other message from today. Caregiver req liquid or med that can be crushed due to difficulty swallowing. Please advise.

## 2020-09-04 ENCOUNTER — PATIENT MESSAGE (OUTPATIENT)
Dept: FAMILY MEDICINE | Facility: CLINIC | Age: 85
End: 2020-09-04

## 2020-09-04 DIAGNOSIS — R63.4 WEIGHT LOSS: ICD-10-CM

## 2020-09-04 DIAGNOSIS — I69.391 DYSPHAGIA AS LATE EFFECT OF CEREBROVASCULAR ACCIDENT (CVA): Primary | ICD-10-CM

## 2020-09-04 NOTE — TELEPHONE ENCOUNTER
"Patient daughter is asking about "criteria" for feeling tube placement.   Referral to Gen Surgery placed for review.     "

## 2020-09-10 ENCOUNTER — TELEPHONE (OUTPATIENT)
Dept: FAMILY MEDICINE | Facility: CLINIC | Age: 85
End: 2020-09-10

## 2020-09-10 ENCOUNTER — PATIENT MESSAGE (OUTPATIENT)
Dept: FAMILY MEDICINE | Facility: CLINIC | Age: 85
End: 2020-09-10

## 2020-09-10 DIAGNOSIS — R47.01 EXPRESSIVE APHASIA: ICD-10-CM

## 2020-09-10 DIAGNOSIS — I63.319 CEREBROVASCULAR ACCIDENT (CVA) DUE TO THROMBOSIS OF MIDDLE CEREBRAL ARTERY, UNSPECIFIED BLOOD VESSEL LATERALITY: Primary | ICD-10-CM

## 2020-09-10 NOTE — TELEPHONE ENCOUNTER
----- Message from Madai Bhardwaj sent at 9/10/2020  9:56 AM CDT -----  Regarding: Advice  Contact: Ca with [facility]  Advice    Ca with [facility] called stated that a demographic sheet is needed that shows patient insurance.    Please call back [phone] fax [phone]

## 2020-09-11 PROCEDURE — G0180 PR HOME HEALTH MD CERTIFICATION: ICD-10-PCS | Mod: ,,, | Performed by: FAMILY MEDICINE

## 2020-09-11 PROCEDURE — G0180 MD CERTIFICATION HHA PATIENT: HCPCS | Mod: ,,, | Performed by: FAMILY MEDICINE

## 2020-09-15 ENCOUNTER — PATIENT MESSAGE (OUTPATIENT)
Dept: FAMILY MEDICINE | Facility: CLINIC | Age: 85
End: 2020-09-15

## 2020-09-15 DIAGNOSIS — B37.2 YEAST DERMATITIS: Primary | ICD-10-CM

## 2020-09-15 RX ORDER — CLOTRIMAZOLE AND BETAMETHASONE DIPROPIONATE 10; .64 MG/G; MG/G
CREAM TOPICAL 2 TIMES DAILY
Qty: 45 G | Refills: 2 | Status: SHIPPED | OUTPATIENT
Start: 2020-09-15 | End: 2021-05-28

## 2020-09-16 ENCOUNTER — PATIENT MESSAGE (OUTPATIENT)
Dept: FAMILY MEDICINE | Facility: CLINIC | Age: 85
End: 2020-09-16

## 2020-09-16 DIAGNOSIS — B37.2 YEAST DERMATITIS: Primary | ICD-10-CM

## 2020-09-16 RX ORDER — NYSTATIN 100000 [USP'U]/G
POWDER TOPICAL 4 TIMES DAILY
Qty: 60 G | Refills: 5 | Status: ON HOLD | OUTPATIENT
Start: 2020-09-16 | End: 2021-08-03 | Stop reason: HOSPADM

## 2020-09-21 ENCOUNTER — PATIENT MESSAGE (OUTPATIENT)
Dept: FAMILY MEDICINE | Facility: CLINIC | Age: 85
End: 2020-09-21

## 2020-09-21 DIAGNOSIS — M25.369 INSTABILITY OF KNEE JOINT, UNSPECIFIED LATERALITY: Primary | ICD-10-CM

## 2020-09-21 NOTE — TELEPHONE ENCOUNTER
Pt is req an order for knee brace to be sent to [facility]. Please advise.

## 2020-09-22 ENCOUNTER — OFFICE VISIT (OUTPATIENT)
Dept: OPHTHALMOLOGY | Facility: CLINIC | Age: 85
End: 2020-09-22
Payer: MEDICARE

## 2020-09-22 ENCOUNTER — TELEPHONE (OUTPATIENT)
Dept: OPHTHALMOLOGY | Facility: CLINIC | Age: 85
End: 2020-09-22

## 2020-09-22 DIAGNOSIS — H11.31 SUBCONJUNCTIVAL HEMORRHAGE OF RIGHT EYE: Primary | ICD-10-CM

## 2020-09-22 DIAGNOSIS — H25.13 AGE-RELATED NUCLEAR CATARACT OF BOTH EYES: ICD-10-CM

## 2020-09-22 PROCEDURE — 92002 PR EYE EXAM, NEW PATIENT,INTERMED: ICD-10-PCS | Mod: S$PBB,,, | Performed by: OPHTHALMOLOGY

## 2020-09-22 PROCEDURE — 99999 PR PBB SHADOW E&M-EST. PATIENT-LVL III: CPT | Mod: PBBFAC,,, | Performed by: OPHTHALMOLOGY

## 2020-09-22 PROCEDURE — 99999 PR PBB SHADOW E&M-EST. PATIENT-LVL III: ICD-10-PCS | Mod: PBBFAC,,, | Performed by: OPHTHALMOLOGY

## 2020-09-22 PROCEDURE — 99213 OFFICE O/P EST LOW 20 MIN: CPT | Mod: PBBFAC,PO | Performed by: OPHTHALMOLOGY

## 2020-09-22 PROCEDURE — 92002 INTRM OPH EXAM NEW PATIENT: CPT | Mod: S$PBB,,, | Performed by: OPHTHALMOLOGY

## 2020-09-22 NOTE — TELEPHONE ENCOUNTER
Spoke with pt's daughter Josee, notified her that the only time available I had today was for 9 so if she is able to come right now we can see her. Pt's daughter stated they live close and will be here shortly. Pt was appreciative of call and understood we were working them in to be seen this morning.     ----- Message from Priscilla Valdovinos sent at 9/22/2020  8:10 AM CDT -----  Contact: @Josee  Type:  Same Day Appointment Request    Caller is requesting a same day appointment.  Caller declined first available appointment listed below.      Name of Caller:  Daughter   When is the first available appointment?  Unable to schedule  Symptoms:  Red swollen painful right eye  Best Call Back Number:   Additional Information:   Per daughter due to transportation issues if there is anyway to get her in before 1 today-please advise-thank you

## 2020-09-22 NOTE — PROGRESS NOTES
HPI     Eye Problem      Additional comments: Hemorrhage OD              Comments     DLE: x 1 yr by Dr Lynne at [facility]    Pt states woke up Sunday morning with some bruising in corner OD with sub   conj hem. Had choking episode on Friday and eye was itching on Saturday   night. No pain or blurred vision. Using Thera Tears OU.           Last edited by Cheryle Quintana on 9/22/2020 10:22 AM. (History)        ROS     Positive for: Heme/Lymph    Last edited by Iglesia Brownlee Jr., MD on 9/22/2020 10:41 AM. (History)        Assessment /Plan     For exam results, see Encounter Report.    Subconjunctival hemorrhage of right eye    Age-related nuclear cataract of both eyes      No intervention necessary  AT's prn  -no decrease in ADLS, no significant glare, and functionality is satisfactory  -counseled on what to expect if the cataracts worsening and how it can effect the vision  -continue to monitor and if vision changes patient can call for another appointment  Follow up in about 2 months (around 11/22/2020) for Dilation and refraction.

## 2020-09-25 ENCOUNTER — DOCUMENT SCAN (OUTPATIENT)
Dept: HOME HEALTH SERVICES | Facility: HOSPITAL | Age: 85
End: 2020-09-25
Payer: MEDICARE

## 2020-09-26 ENCOUNTER — DOCUMENT SCAN (OUTPATIENT)
Dept: HOME HEALTH SERVICES | Facility: HOSPITAL | Age: 85
End: 2020-09-26
Payer: MEDICARE

## 2020-09-28 ENCOUNTER — DOCUMENT SCAN (OUTPATIENT)
Dept: HOME HEALTH SERVICES | Facility: HOSPITAL | Age: 85
End: 2020-09-28
Payer: MEDICARE

## 2020-09-29 ENCOUNTER — PATIENT MESSAGE (OUTPATIENT)
Dept: FAMILY MEDICINE | Facility: CLINIC | Age: 85
End: 2020-09-29

## 2020-09-29 NOTE — TELEPHONE ENCOUNTER
Please print order for knee brace and fax to [facility].  It is very important it be sent to day.  Please let her daughter know.

## 2020-09-29 NOTE — TELEPHONE ENCOUNTER
Please print the order for the knee brace and fax to [facility]--very important it be done today.

## 2020-09-30 ENCOUNTER — PATIENT MESSAGE (OUTPATIENT)
Dept: FAMILY MEDICINE | Facility: CLINIC | Age: 85
End: 2020-09-30

## 2020-10-01 ENCOUNTER — DOCUMENT SCAN (OUTPATIENT)
Dept: HOME HEALTH SERVICES | Facility: HOSPITAL | Age: 85
End: 2020-10-01
Payer: MEDICARE

## 2020-10-01 ENCOUNTER — PATIENT MESSAGE (OUTPATIENT)
Dept: OTHER | Facility: OTHER | Age: 85
End: 2020-10-01

## 2020-10-05 ENCOUNTER — EXTERNAL HOME HEALTH (OUTPATIENT)
Dept: HOME HEALTH SERVICES | Facility: HOSPITAL | Age: 85
End: 2020-10-05
Payer: MEDICARE

## 2020-10-09 ENCOUNTER — DOCUMENT SCAN (OUTPATIENT)
Dept: HOME HEALTH SERVICES | Facility: HOSPITAL | Age: 85
End: 2020-10-09
Payer: MEDICARE

## 2020-10-09 ENCOUNTER — PATIENT MESSAGE (OUTPATIENT)
Dept: FAMILY MEDICINE | Facility: CLINIC | Age: 85
End: 2020-10-09

## 2020-10-12 ENCOUNTER — DOCUMENT SCAN (OUTPATIENT)
Dept: HOME HEALTH SERVICES | Facility: HOSPITAL | Age: 85
End: 2020-10-12
Payer: MEDICARE

## 2020-10-14 ENCOUNTER — PATIENT MESSAGE (OUTPATIENT)
Dept: FAMILY MEDICINE | Facility: CLINIC | Age: 85
End: 2020-10-14

## 2020-10-14 DIAGNOSIS — M25.569 KNEE PAIN, UNSPECIFIED CHRONICITY, UNSPECIFIED LATERALITY: Primary | ICD-10-CM

## 2020-10-15 ENCOUNTER — TELEPHONE (OUTPATIENT)
Dept: FAMILY MEDICINE | Facility: CLINIC | Age: 85
End: 2020-10-15

## 2020-10-15 ENCOUNTER — PATIENT MESSAGE (OUTPATIENT)
Dept: FAMILY MEDICINE | Facility: CLINIC | Age: 85
End: 2020-10-15

## 2020-10-15 DIAGNOSIS — M25.561 PAIN IN BOTH KNEES, UNSPECIFIED CHRONICITY: Primary | ICD-10-CM

## 2020-10-15 DIAGNOSIS — M25.562 PAIN IN BOTH KNEES, UNSPECIFIED CHRONICITY: Primary | ICD-10-CM

## 2020-10-15 NOTE — TELEPHONE ENCOUNTER
----- Message from Effie De La Fuente sent at 10/15/2020  8:36 AM CDT -----  Contact: pt  Type: Needs Medical Advice    Who Called:   pt  Best Call Back Number: [phone]  Additional Information: Requesting a call back regarding pt daughter is asking for a referral for ortho for knee pain  Please Advise ---Thank you

## 2020-10-16 ENCOUNTER — OFFICE VISIT (OUTPATIENT)
Dept: ORTHOPEDICS | Facility: CLINIC | Age: 85
End: 2020-10-16
Payer: MEDICARE

## 2020-10-16 ENCOUNTER — HOSPITAL ENCOUNTER (OUTPATIENT)
Dept: RADIOLOGY | Facility: HOSPITAL | Age: 85
Discharge: HOME | End: 2020-10-16
Attending: ORTHOPAEDIC SURGERY
Payer: MEDICARE

## 2020-10-16 VITALS — HEIGHT: 62 IN | BODY MASS INDEX: 20.08 KG/M2 | WEIGHT: 109.13 LBS

## 2020-10-16 DIAGNOSIS — R47.01 EXPRESSIVE APHASIA: ICD-10-CM

## 2020-10-16 DIAGNOSIS — M17.0 OSTEOARTHRITIS OF BOTH KNEES, UNSPECIFIED OSTEOARTHRITIS TYPE: ICD-10-CM

## 2020-10-16 DIAGNOSIS — M25.561 PAIN IN BOTH KNEES, UNSPECIFIED CHRONICITY: ICD-10-CM

## 2020-10-16 DIAGNOSIS — M25.561 PAIN IN BOTH KNEES, UNSPECIFIED CHRONICITY: Primary | ICD-10-CM

## 2020-10-16 DIAGNOSIS — M25.569 KNEE PAIN, UNSPECIFIED CHRONICITY, UNSPECIFIED LATERALITY: ICD-10-CM

## 2020-10-16 DIAGNOSIS — I63.319 CEREBROVASCULAR ACCIDENT (CVA) DUE TO THROMBOSIS OF MIDDLE CEREBRAL ARTERY, UNSPECIFIED BLOOD VESSEL LATERALITY: ICD-10-CM

## 2020-10-16 DIAGNOSIS — M25.562 PAIN IN BOTH KNEES, UNSPECIFIED CHRONICITY: Primary | ICD-10-CM

## 2020-10-16 DIAGNOSIS — M25.562 PAIN IN BOTH KNEES, UNSPECIFIED CHRONICITY: ICD-10-CM

## 2020-10-16 PROCEDURE — 73562 XR KNEE ORTHO BILAT: ICD-10-PCS | Mod: 26,50,, | Performed by: RADIOLOGY

## 2020-10-16 PROCEDURE — 99999 PR PBB SHADOW E&M-EST. PATIENT-LVL III: CPT | Mod: PBBFAC,,, | Performed by: ORTHOPAEDIC SURGERY

## 2020-10-16 PROCEDURE — 73562 X-RAY EXAM OF KNEE 3: CPT | Mod: 26,50,, | Performed by: RADIOLOGY

## 2020-10-16 PROCEDURE — 99213 OFFICE O/P EST LOW 20 MIN: CPT | Mod: PBBFAC,25,PN | Performed by: ORTHOPAEDIC SURGERY

## 2020-10-16 PROCEDURE — 99999 PR PBB SHADOW E&M-EST. PATIENT-LVL III: ICD-10-PCS | Mod: PBBFAC,,, | Performed by: ORTHOPAEDIC SURGERY

## 2020-10-16 PROCEDURE — 73562 X-RAY EXAM OF KNEE 3: CPT | Mod: TC,50,PO

## 2020-10-16 PROCEDURE — 99204 PR OFFICE/OUTPT VISIT, NEW, LEVL IV, 45-59 MIN: ICD-10-PCS | Mod: 25,S$PBB,, | Performed by: ORTHOPAEDIC SURGERY

## 2020-10-16 PROCEDURE — 20610 DRAIN/INJ JOINT/BURSA W/O US: CPT | Mod: PBBFAC,PN | Performed by: ORTHOPAEDIC SURGERY

## 2020-10-16 PROCEDURE — 20610 LARGE JOINT ASPIRATION/INJECTION: R KNEE: ICD-10-PCS | Mod: S$PBB,RT,, | Performed by: ORTHOPAEDIC SURGERY

## 2020-10-16 PROCEDURE — 99204 OFFICE O/P NEW MOD 45 MIN: CPT | Mod: 25,S$PBB,, | Performed by: ORTHOPAEDIC SURGERY

## 2020-10-16 RX ORDER — TRIAMCINOLONE ACETONIDE 40 MG/ML
40 INJECTION, SUSPENSION INTRA-ARTICULAR; INTRAMUSCULAR
Status: DISCONTINUED | OUTPATIENT
Start: 2020-10-16 | End: 2020-10-16 | Stop reason: HOSPADM

## 2020-10-16 RX ADMIN — TRIAMCINOLONE ACETONIDE 40 MG: 40 INJECTION, SUSPENSION INTRA-ARTICULAR; INTRAMUSCULAR at 08:10

## 2020-10-16 NOTE — PROCEDURES
Large Joint Aspiration/Injection: R knee    Date/Time: 10/16/2020 8:30 AM  Performed by: Onur Fabian MD  Authorized by: Onur Fabian MD     Consent Done?:  Yes (Verbal)  Timeout: prior to procedure the correct patient, procedure, and site was verified    Prep: patient was prepped and draped in usual sterile fashion      Details:  Needle Size:  21 G  Approach:  Anterolateral  Location:  Knee  Site:  R knee  Medications:  40 mg triamcinolone acetonide 40 mg/mL  Patient tolerance:  Patient tolerated the procedure well with no immediate complications

## 2020-10-16 NOTE — PROGRESS NOTES
A 6 years old complaining of pain in the right knee which has had now for couple months time.  No one time traumatic event she can recall.  Pain is up to  10 on the pain scale.  Patient does report a history of a stroke.  Reports has been walking a few months ago but is now not able to walk due to the pain that she has in her knee per the patient and daughters report.  Ice seems to help were standing with weight on it makes it worse    Exam shows tenderness to medial joint line without signs infection or instability no swelling, hip motion does not reproduce her symptoms does have difficulty with ankle dorsiflexion    X-rays show osteopenia with mild degenerative changes    Assessment:  Right knee pain in a patient who has had a stroke with mild degenerative changes noted in knee x-ray    Plan:  Recommend conservative care, patient and daughter are interested in a cortisone shot which we will attempt hopefully she will get relief from it, follow-up as needed    Further History  Aching pain  Worse with activity  Relieved with rest  No other associated symptoms  No other radiation    Further Exam  Alert and oriented  Pleasant  Contralateral limb has appropriate range of motion for age and condition  Contralateral limb has appropriate strength for age and condition  Contralateral limb has appropriate stability  for age and condition  No adenopathy  Pulses are appropriate for current condition  Skin is intact        Chief Complaint    Chief Complaint   Patient presents with    Left Knee - Pain       HPI  Muna Cedillo is a 86 y.o.  female who presents with       Past Medical History  Past Medical History:   Diagnosis Date    Arthritis     back pain    Breast cancer 1998    was treated with radiation    Cataract     Diverticulosis     Hypertension     Shingles     Stroke ~11/2019       Past Surgical History  Past Surgical History:   Procedure Laterality Date    BREAST BIOPSY Right     malignant    BREAST  LUMPECTOMY Right     malignant    COLONOSCOPY  05/05/2014    Dr. Ponce, in media: hemorrhoids, diverticulosis, no repeat colonoscopy for screening recommended    CORONARY STENT PLACEMENT  11/2019    HYSTERECTOMY      partial hysterectomy, has both ovaries       Medications  Current Outpatient Medications   Medication Sig    acetaminophen (TYLENOL) 325 MG tablet Take 325 mg by mouth as needed for Pain.    acetaminophen with codeine (ACETAMINOPHEN-CODEINE) 120mg 12mg 5mL Soln 5 to 10 ml by mouth every 6 hours as needed for pain    clopidogrel (PLAVIX) 75 mg tablet 1 tablet    clotrimazole-betamethasone 1-0.05% (LOTRISONE) cream Apply topically 2 (two) times daily.    nystatin (MYCOSTATIN) powder Apply topically 4 (four) times daily.    pantoprazole (PROTONIX) 20 MG tablet TAKE 1 TABLET(20 MG) BY MOUTH BEFORE BREAKFAST    pravastatin (PRAVACHOL) 40 MG tablet Take 1 tablet (40 mg total) by mouth every evening.    valsartan (DIOVAN) 80 MG tablet      No current facility-administered medications for this visit.        Allergies  Review of patient's allergies indicates:  No Known Allergies    Family History  Family History   Problem Relation Age of Onset    Colon cancer Mother         unsure of age of diagnosis    Cancer Brother         brain tumor    Multiple sclerosis Brother     Crohn's disease Neg Hx     Ulcerative colitis Neg Hx     Stomach cancer Neg Hx     Esophageal cancer Neg Hx        Social History  Social History     Socioeconomic History    Marital status:      Spouse name: Not on file    Number of children: 3    Years of education: Not on file    Highest education level: Not on file   Occupational History    Not on file   Social Needs    Financial resource strain: Not on file    Food insecurity     Worry: Not on file     Inability: Not on file    Transportation needs     Medical: Not on file     Non-medical: Not on file   Tobacco Use    Smoking status: Never Smoker     Smokeless tobacco: Never Used   Substance and Sexual Activity    Alcohol use: No    Drug use: No    Sexual activity: Never   Lifestyle    Physical activity     Days per week: Not on file     Minutes per session: Not on file    Stress: Not on file   Relationships    Social connections     Talks on phone: Not on file     Gets together: Not on file     Attends Hindu service: Not on file     Active member of club or organization: Not on file     Attends meetings of clubs or organizations: Not on file     Relationship status: Not on file   Other Topics Concern    Not on file   Social History Narrative    Not on file               Review of Systems     Constitutional: Negative    HENT: Negative  Eyes: Negative  Respiratory: Negative  Cardiovascular: Negative  Musculoskeletal: HPI  Skin: Negative  Neurological: Negative  Hematological: Negative  Endocrine: Negative                 Physical Exam    There were no vitals filed for this visit.  Body mass index is 19.7 kg/m².  Physical Examination:     General appearance -  well appearing, and in no distress  Mental status - awake  Neck - supple  Chest -  symmetric air entry  Heart - normal rate   Abdomen - soft      Assessment     1. Pain in both knees, unspecified chronicity    2. Knee pain, unspecified chronicity, unspecified laterality          Plan

## 2020-10-16 NOTE — LETTER
October 16, 2020      Smith Stock III, CORA  [address] [facility] [place] [address]           [facility]  [address] [place] [address]  Phone: [phone]          Patient: Muna Cedillo   MR Number: [id number]   YOB: 1934   Date of Visit: 10/16/2020       Dear Smith Stock III:    Thank you for referring Muna Cedillo to me for evaluation. Attached you will find relevant portions of my assessment and plan of care.    If you have questions, please do not hesitate to call me. I look forward to following Muna Cedillo along with you.    Sincerely,    Onur Fabian MD    Enclosure  CC:  No Recipients    If you would like to receive this communication electronically, please contact [email or web address] or [phone] to request more information on [facility] access.    For providers and/or their staff who would like to refer a patient to [facility], please contact us through our one-stop-shop provider referral line, [facility], at [phone].    If you feel you have received this communication in error or would no longer like to receive these types of communications, please e-mail [email or web address]

## 2020-10-19 ENCOUNTER — PATIENT MESSAGE (OUTPATIENT)
Dept: FAMILY MEDICINE | Facility: CLINIC | Age: 85
End: 2020-10-19

## 2020-10-19 ENCOUNTER — TELEPHONE (OUTPATIENT)
Dept: FAMILY MEDICINE | Facility: CLINIC | Age: 85
End: 2020-10-19

## 2020-10-19 NOTE — TELEPHONE ENCOUNTER
----- Message from Paris Xiong sent at 10/16/2020  9:33 AM CDT -----  Type: Needs Medical Advice    Who Called:  Itzel with [facility]  Best Call Back Number: [phone]  Additional Information:  Sent Detailed Written Order for AFO/needs to know if received /needs signature and sent back.

## 2020-10-23 ENCOUNTER — DOCUMENT SCAN (OUTPATIENT)
Dept: HOME HEALTH SERVICES | Facility: HOSPITAL | Age: 85
End: 2020-10-23
Payer: MEDICARE

## 2020-10-26 ENCOUNTER — TELEPHONE (OUTPATIENT)
Dept: FAMILY MEDICINE | Facility: CLINIC | Age: 85
End: 2020-10-26

## 2020-11-03 ENCOUNTER — OFFICE VISIT (OUTPATIENT)
Dept: FAMILY MEDICINE | Facility: CLINIC | Age: 85
End: 2020-11-03
Payer: MEDICARE

## 2020-11-03 ENCOUNTER — DOCUMENTATION ONLY (OUTPATIENT)
Dept: FAMILY MEDICINE | Facility: CLINIC | Age: 85
End: 2020-11-03

## 2020-11-03 VITALS
HEIGHT: 61 IN | HEART RATE: 90 BPM | SYSTOLIC BLOOD PRESSURE: 110 MMHG | DIASTOLIC BLOOD PRESSURE: 64 MMHG | WEIGHT: 95 LBS | TEMPERATURE: 98 F | BODY MASS INDEX: 17.94 KG/M2 | OXYGEN SATURATION: 99 %

## 2020-11-03 DIAGNOSIS — R63.0 ANOREXIA: ICD-10-CM

## 2020-11-03 DIAGNOSIS — I63.319 CEREBROVASCULAR ACCIDENT (CVA) DUE TO THROMBOSIS OF MIDDLE CEREBRAL ARTERY, UNSPECIFIED BLOOD VESSEL LATERALITY: ICD-10-CM

## 2020-11-03 DIAGNOSIS — R47.01 EXPRESSIVE APHASIA: ICD-10-CM

## 2020-11-03 DIAGNOSIS — R13.10 DYSPHAGIA, UNSPECIFIED TYPE: Primary | ICD-10-CM

## 2020-11-03 DIAGNOSIS — R63.4 WEIGHT LOSS: ICD-10-CM

## 2020-11-03 PROCEDURE — 99214 PR OFFICE/OUTPT VISIT, EST, LEVL IV, 30-39 MIN: ICD-10-PCS | Mod: S$PBB,,, | Performed by: FAMILY MEDICINE

## 2020-11-03 PROCEDURE — 99999 PR PBB SHADOW E&M-EST. PATIENT-LVL III: ICD-10-PCS | Mod: PBBFAC,,, | Performed by: FAMILY MEDICINE

## 2020-11-03 PROCEDURE — 99999 PR PBB SHADOW E&M-EST. PATIENT-LVL III: CPT | Mod: PBBFAC,,, | Performed by: FAMILY MEDICINE

## 2020-11-03 PROCEDURE — 90694 VACC AIIV4 NO PRSRV 0.5ML IM: CPT | Mod: PBBFAC,PO

## 2020-11-03 PROCEDURE — 99214 OFFICE O/P EST MOD 30 MIN: CPT | Mod: S$PBB,,, | Performed by: FAMILY MEDICINE

## 2020-11-03 PROCEDURE — G0008 ADMIN INFLUENZA VIRUS VAC: HCPCS | Mod: PBBFAC,PO

## 2020-11-03 PROCEDURE — 99213 OFFICE O/P EST LOW 20 MIN: CPT | Mod: PBBFAC,PO | Performed by: FAMILY MEDICINE

## 2020-11-03 RX ORDER — CYPROHEPTADINE HYDROCHLORIDE 4 MG/1
4 TABLET ORAL
Qty: 60 TABLET | Refills: 1 | Status: SHIPPED | OUTPATIENT
Start: 2020-11-03 | End: 2021-01-13

## 2020-11-03 NOTE — PROGRESS NOTES
"Subjective:       Patient ID: Muna Cedillo is a 86 y.o. female.    Chief Complaint: Weight Check    Pt is known to me.  Since her last visit, the pt has had a lumbar nerve decompression at [facility].  It was successful.  She had a period of being bed bound and she lost more weight.  She is still having trouble with dysphagia since her stroke.  She has lost 14 pounds since her visit with me in August.  She also just not really get hungry.  The daughter asks about a feeding tube but the pt adamantly refuses.  She continues to work with speech therapy regarding swallowing.    Review of Systems   Constitutional: Positive for appetite change. Negative for activity change, fatigue and unexpected weight change.   HENT: Positive for trouble swallowing.    Eyes: Negative for visual disturbance.   Respiratory: Negative for cough, chest tightness and shortness of breath.    Cardiovascular: Negative for chest pain, palpitations and leg swelling.   Gastrointestinal: Negative for abdominal pain, constipation, diarrhea, nausea and vomiting.   Endocrine: Negative for cold intolerance, heat intolerance and polyuria.   Genitourinary: Negative for decreased urine volume and dysuria.   Musculoskeletal: Positive for gait problem. Negative for arthralgias and back pain.   Skin: Negative for rash.   Neurological: Positive for speech difficulty. Negative for numbness and headaches.   Psychiatric/Behavioral: Negative for sleep disturbance.       Objective:       Vitals:    11/03/20 1059   BP: 110/64   BP Location: Right arm   Patient Position: Sitting   Pulse: 90   Temp: 97.7 °F (36.5 °C)   TempSrc: Oral   SpO2: 99%   Weight: 43.1 kg (95 lb 0.3 oz)   Height: 5' 1" (1.549 m)     Physical Exam  Constitutional:       General: She is not in acute distress.     Appearance: She is well-developed. She is not ill-appearing.      Comments: Pt is thin   HENT:      Head: Normocephalic.   Eyes:      Conjunctiva/sclera: Conjunctivae normal.      Pupils: " Pupils are equal, round, and reactive to light.   Neck:      Musculoskeletal: Normal range of motion and neck supple.      Thyroid: No thyromegaly.   Cardiovascular:      Rate and Rhythm: Normal rate and regular rhythm.      Heart sounds: Normal heart sounds.   Pulmonary:      Effort: Pulmonary effort is normal.      Breath sounds: Normal breath sounds.   Abdominal:      General: Bowel sounds are normal.      Palpations: Abdomen is soft.      Tenderness: There is no abdominal tenderness.   Musculoskeletal: Normal range of motion.         General: No tenderness or deformity.   Lymphadenopathy:      Cervical: No cervical adenopathy.   Skin:     General: Skin is warm and dry.   Neurological:      Mental Status: She is alert and oriented to person, place, and time.      Cranial Nerves: No cranial nerve deficit.      Motor: No abnormal muscle tone.      Coordination: Coordination normal.      Deep Tendon Reflexes: Reflexes normal.      Comments: Expressive aphasia, weak right leg, impaired gait.   Psychiatric:         Mood and Affect: Mood normal.         Behavior: Behavior normal.         Thought Content: Thought content normal.         Assessment:       1. Dysphagia, unspecified type    2. Expressive aphasia    3. Cerebrovascular accident (CVA) due to thrombosis of middle cerebral artery, unspecified blood vessel laterality    4. Anorexia    5. Weight loss        Plan:       Muna was seen today for weight check.    Diagnoses and all orders for this visit:    Dysphagia, unspecified type    Expressive aphasia    Cerebrovascular accident (CVA) due to thrombosis of middle cerebral artery, unspecified blood vessel laterality    Anorexia  -     cyproheptadine (PERIACTIN) 4 mg tablet; Take 1 tablet (4 mg total) by mouth 2 (two) times daily before meals. For appetite    Weight loss    Other orders  -     Influenza - Quadrivalent (Adjuvanted)      During this visit, I reviewed the pt's history, medications, allergies, and  problem list.    Will see pt back in 3 months.  If weight has not stabilized, will revisit possible feeding tube.

## 2020-11-07 ENCOUNTER — PATIENT MESSAGE (OUTPATIENT)
Dept: FAMILY MEDICINE | Facility: CLINIC | Age: 85
End: 2020-11-07

## 2020-11-07 DIAGNOSIS — H92.09 OTALGIA, UNSPECIFIED LATERALITY: Primary | ICD-10-CM

## 2020-11-09 ENCOUNTER — PATIENT MESSAGE (OUTPATIENT)
Dept: FAMILY MEDICINE | Facility: CLINIC | Age: 85
End: 2020-11-09

## 2020-11-09 DIAGNOSIS — I69.391 DYSPHAGIA AS LATE EFFECT OF CEREBROVASCULAR ACCIDENT (CVA): Primary | ICD-10-CM

## 2020-11-09 DIAGNOSIS — R63.4 WEIGHT LOSS: ICD-10-CM

## 2020-11-09 NOTE — TELEPHONE ENCOUNTER
Please see [employer] message. Requesting referral. Order pended. Please advise   Reason For Visit  MAXI JOHNSON is an established patient here today for a chief complaint of diziness x 3 days .   :  services not used.   She is unaccompanied.        Quality    Adult Wellness CI height not documented, discussion of regular exercise, exercising regularly, no printed information given for activities, discussion of nutritional quality of diet, patient education given about proper diet, not using alcohol, not having considered quitting drinking, not getting angry when talked to about drinking, not having a drink or two in the morning to get going, not drinking alcohol regularly, and feeling guilty about it, no tobacco use, did not provide intervention and counseling in regards to tobacco use, mammogram performed: 09/07/2018, does not have feelings of hopelessness (PHQ-2), no Anhedonia (PHQ-2), not referred to local mental health center, not taking medication for depression, no monitoring patient, preventive medicine therapy for influenza, taking aspirin, no discussion of risks and benefits of Aspirin and offending medication withdrawn because of a contraindication - aspirin.   Diabetes CI height documented, no tobacco use, did not provide intervention and counseling in regards to tobacco use, does not have feelings of hopelessness (PHQ-2), no anhedonia (PHQ-2), not referred to [facility], not taking medication for depression, no monitoring patient and preventative medicine test results not documented/reviewed for Hemoglobin A1c.      History of Present Illness  53 yo female presenting to the clinic unaccompanied with complaint of intermittent dizziness X 3 days. Patient did not bring her  today and our  in the office is not in use. All communication via pen and paper.   States that she has intermittent dizziness when she stands or bends over. Does not feel like the room around her is spinning. She cannot estimate how long the dizziness lasts  for but states \"not long.\" Patient states that she has not tried anything for her condition. Based on previous labs, her diabetes has not been controlled and her last glucose yesterday she reports was over 300. States that she is taking her insulin as prescribed. She has no cp, sob, visual changes, fatigue. she is on atenolol for palpitations given to her by cardiology of which she states she has not experienced since starting medication.      Review of Systems    Const:. Per HPI.   Eyes:. Per HPI.   ENT:. Per HPI.   CV:. Per HPI.   Resp:. Per HPI.   Neuro:. Per HPI.       Allergies  Denied   MetFORMIN HCl TABS    Current Meds   1. Accu-Chek FastClix Lancets; TEST THREE TIMES DAILY;   Therapy: 89Rmr8110 to (Last Rx:19Wxs7651)  Requested for: 83Uac7837 Ordered   2. Accu-Chek Guide In Vitro Strip; TEST 3 TIMES DAILY;   Therapy: 63Bbq5951 to (Last Rx:63Siu5782)  Requested for: 26Otj0024 Ordered   3. Accu-Chek Guide w/Device Kit; USE AS DIRECTED;   Therapy: 08Ywm7374 to (Last Rx:41Fkq5226)  Requested for: 58Iqi5896 Ordered   4. Allegra-D Allergy & Congestion 180-240 MG Oral Tablet Extended Release 24 Hour; TAKE   1 TABLET DAILY;   Therapy: 75Xrp8200 to (Evaluate:04Oct2018)  Requested for: 75Sar9771; Last   Rx:[id number] Ordered   5. Amoxicillin-Pot Clavulanate 875-125 MG Oral Tablet; TAKE 1 TABLET EVERY 12 HOURS   UNTIL GONE;   Therapy: 68Bro6284 to (Evaluate:11Oct2018)  Requested for: 68Swx1052; Last   Rx:[id number] Ordered   6. Aspirin 81 MG Oral Tablet Delayed Release; TAKE 1 TABLET DAILY;   Therapy: 60Ldy6636 to (Evaluate:25Nov2014); Last Rx:62Nei6795 Ordered   7. Atenolol 50 MG Oral Tablet; TAKE TWO TABLETS BY MOUTH TWICE DAILY AS   DIRECTED;   Therapy: 06Nov2017 to (Last Rx:19Ubf0003)  Requested for: 65Txp5655 Ordered   8. BD Insulin Syringe 30G X 1/2\" 0.5 ML; USE AS DIRECTED 3 time daily;   Therapy: 17Jan2014 to (Evaluate:70Yuc3404)  Requested for: 02Jan2018; Last   Rx:02Jan2018 Ordered   9. Centrum Silver TABS;    Therapy: (Recorded:49Zhj2474) to Recorded   10. Fluticasone Propionate 50 MCG/ACT Nasal Suspension;    Therapy: (Recorded:54Fpn5502) to Recorded   11. Lantus 100 UNIT/ML Subcutaneous Solution; INJECT 50 UNIT BEDTIME;    Therapy: 43Mlt8494 to (Evaluate:14Jan2019)  Requested for: 26Oct2018; Last    Rx:78Izq6631 Ordered   12. Losartan Potassium-HCTZ 100-25 MG Oral Tablet; TAKE 1 TABLET BY MOUTH DAILY;    Therapy: 26Whq3783 to (Evaluate:65Fxp4141)  Requested for: 46Qtl3590; Last    Rx:[id number] Ordered   13. Mometasone Furoate 50 MCG/ACT Nasal Suspension; USE 1 TO 2 SPRAYS IN EACH    NOSTRIL ONCE DAILY;    Therapy: 61Gjn8268 to (Last Rx:60Qrw4078)  Requested for: 33Mum0671 Ordered   14. Naproxen 500 MG Oral Tablet; TAKE ONE TABLET BY MOUTH TWICE DAILY AS    NEEDED;    Therapy: 64Kly8701 to (Last Rx:13Mka5771)  Requested for: 41Hqo7129 Ordered   15. NovoLOG Mix 70/30 (70-30) 100 UNIT/ML Subcutaneous Suspension; INJECT 10 UNITS    UNDER THE SKIN TWICE DAILY;    Therapy: 62Cmu4518 to (Evaluate:03Mma5056)  Requested for: 88Jdy7931; Last    Rx:[id number] Ordered   16. Omeprazole 40 MG Oral Capsule Delayed Release; TAKE ONE CAPSULE EVERY    MORNING;    Therapy: 37Ymo1431 to (Last Rx:05Nov2018)  Requested for: 05Nov2018 Ordered   17. Promethazine-DM 6.25-15 MG/5ML Oral Syrup; TAKE 5 ML EVERY 4 TO 6 HOURS AS    NEEDED;    Therapy: 64Svc5437 to (Evaluate:97Qow8598)  Requested for: 38Gob4371; Last    Rx:30Qnq1155 Ordered   18. Simvastatin 80 MG Oral Tablet; TAKE ONE TABLET BY MOUTH NIGHTLY AT BEDTIME;    Therapy: 71Xep2659 to (Evaluate:67Tzj7223)  Requested for: 48Gtj1821; Last    Rx:37Wpa8748 Ordered   19. Triamcinolone Acetonide 0.1 % External Cream; Apply topically to affected area bid prn;    Therapy: 51See6383 to (Evaluate:90Fji1283)  Requested for: 24Ahp4462; Last    Rx:13Cth0052 Ordered   20. 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)  Arthralgia of multiple sites (M25.50)  Bilateral shoulder pain (M25.511,M25.512)  Cervical somatic dysfunction (M99.01)  Complete deafness (H91.90)  Congenital Hyperbilirubinemia  Dermatitis (L30.9)  Diabetes mellitus (E11.9)  Elevated C-reactive protein (R79.82)  Encounter for gynecological examination without abnormal finding (Z01.419)  Finger pain (M79.646)  Gilbert's syndrome (E80.4)  Headache (R51)  Hyperlipidemia (E78.5)  Irritable bowel syndrome without diarrhea (K58.9)  Menopausal symptoms (N95.1)  [place] (R47.01)  Need for immunization against influenza (Z23)  Right hip pain (M25.551)  Screening for malignant neoplasm of cervix (Z12.4)  Vaginal odor (N89.8)  Visit for screening mammogram (Z12.31)  Well woman exam with routine gynecological exam (Z01.419)    Essential hypertension (401.9) (I10)       Chronic sinusitis (473.9) (J32.9)          Past Medical History  History of Bilateral Deafness  History of Deaf-mutism (H91.3)  History of Diabetes mellitus with renal manifestation (E11.29)  History of diabetes mellitus (Z86.39)  History of hyperlipidemia (Z86.39)  History of hypertension (Z86.79)  History of Impacted tooth (K01.1)  Need for immunization against influenza (Z23)  History of Other nonmedicinal substance allergy status (Z91.048)  History of Transient Ischemic Deafness    Surgical History  History of Tubal Ligation    Family History  Mother   Family history of diabetes mellitus (Z83.3)  Father   Family history of diabetes mellitus (Z83.3)  Family History   Denied: Family history of Breast Cancer  Denied: Family history of Colon Cancer  Family history of hypertension (Z82.49)   · mother  Family history of type 2 diabetes mellitus (Z83.3)   · mother  Denied: Family history of Ovarian Cancer    Social History  Denied: History of Alcohol Use (History)  Denied: History of Drug Use  Exercising  Regularly  Never a smoker  Never smoker  Denied: Secondhand Tobacco Smoke In Home    Review  Past medical history, problem list and family medical history reviewed.      Vitals  Signs   Recorded: 07Nov2018 02:22PM   Height: 5 ft 6 in  Weight: 181 lb 4 oz  BMI Calculated: 29.25  BSA Calculated: 1.92  Systolic: 128  Diastolic: 70  Temperature: 98.1 F  Heart Rate: 72  Respiration: 18  O2 Saturation: 97    Physical Exam  Constitutional: alert, in no acute distress and current vital signs reviewed.   Head and Face: atraumatic, no deformities, normocephalic, normal facies.   Eyes: no discharge, normal conjunctiva, no eyelid swelling, no ptosis and the sclerae were normal. pupils equal, round and reactive to light and accommodation, conjugate gaze and extraocular movements were intact.   ENT: normal appearing outer ear, normal appearing nose. examination of the tympanic membrane showed normal landmarks, normal appearing external canal.   Pulmonary: no respiratory distress, normal respiratory rate and effort and no accessory muscle use. breath sounds clear to auscultation bilaterally.   Cardiovascular: normal rate, no murmurs were heard, regular rhythm, normal S1 and normal S2. no right carotid bruit right radial 2+ no left carotid bruit left radial 2+   Abdomen: soft, nontender, nondistended, normal bowel sounds and no abdominal mass. no hepatomegaly and no splenomegaly.   Neurologic: cranial nerves grossly intact. no sensory deficits noted. no coordination deficits.      Results/Data    07Nov2018 03:26PM   COMP METABOLIC PANEL WITH CBCA (CPNL,CBCA)   WHITE BLOOD COUNT: 10.5 K/mcL Reference Range 4.2-11.0  RED CELL COUNT: 4.52 mil/mcL Reference Range 4.00-5.20  HEMOGLOBIN: 13.1 g/dl Reference Range 12.0-15.5  HEMATOCRIT: 40.4 % Reference Range 36.0-46.5  MEAN CORPUSCULAR VOLUME: 89.4 fL Reference Range 78.0-100.0  MEAN CORPUSCULAR HEMOGLOBIN: 29.0 pg Reference Range 26.0-34.0  MEAN CORPUSCULAR HGB CONC: 32.4 g/dl Reference  Range 32.0-36.5  RDW-CV: 13.6 % Reference Range 11.0-15.0  PLATELET COUNT: 327 K/mcL Reference Range 140-450  PAPA%: 61 %  LYM%: 32 %  MON%: 6 %  EOS%: 1 %  BASO%: 0 %  PAPA ABS: 6.5 K/mcL Reference Range 1.8-7.7  LYM ABS: 3.4 K/mcL Reference Range 1.0-4.0  MON ABS: 0.6 K/mcL Reference Range 0.3-0.9  EOS ABS: 0.1 K/mcL Reference Range 0.1-0.5  BASO ABS: 0.0 K/mcL Reference Range 0.0-0.3  SODIUM: 139 mmol/L Reference Range 135-145  POTASSIUM: 3.8 mmol/L Reference Range 3.4-5.1  CHLORIDE: 99 mmol/L Reference Range   CARBON DIOXIDE: 30 mmol/L Reference Range 21-32  ANION GAP: 14 mmol/L Reference Range 10-20  GLUCOSE: 176 mg/dl Abnormal High Reference Range 65-99  BUN: 15 mg/dl Reference Range 6-20  CREATININE: 0.76 mg/dl Reference Range 0.51-0.95  GFR EST. AMER: >90   GFR EST.NONAFRI AMER: 90   BUN/CREATININE RATIO: 20  Reference Range 7-25  BILIRUBIN TOTAL: 0.8 mg/dl Reference Range 0.2-1.0  GOT/AST: 15 Units/L Reference Range <38  ALKALINE PHOSPHATASE: 80 Units/L Reference Range   ALBUMIN: 3.7 g/dl Reference Range 3.6-5.1  TOTAL PROTEIN: 8.1 g/dl Reference Range 6.4-8.2  GLOBULIN (CALCULATED): 4.4 g/dl Abnormal High Reference Range 2.0-4.0  A/G RATIO: 0.8  Abnormal Low Reference Range 1.0-2.4  CALCIUM: 8.9 mg/dl Reference Range 8.4-10.2  GPT/ALT: 22 Units/L Reference Range <79  DIFF TYPE: AUTOMATED DIFFERENTIAL   FASTING STATUS: UNKOWN hrs  NRBC: 0 /100 WBC Reference Range 0  PERCENT IMMATURE GRANULOCYTES: 0 %  ABSOLUTE IMMATURE GRANULOCYTES: 0.0 K/mcL Reference Range 0-0.2  VITAMIN B12/FOLATE   VITAMIN B12: >2000 pg/ml Abnormal High Reference Range 211-911  FOLATE: >24.0 ng/ml Reference Range >5.4  FERRITIN   FERRITIN: 281 ng/ml Abnormal High Reference Range 8-252  IRON AND TIBC   IRON: 52 mcg/dl Reference Range   % IRON SATURATION: 18 % Reference Range 15-45  IRON BINDING CAPACITY: 290 mcg/dl Reference Range 250-450  07Nov2018 02:55PM   prc BLOOD SUGAR (ACCUCHECK), IN-OFFICE   GLUCOSE: 163      Immunizations  Influenza --- Series1: 26-Sep-2011; Series2: 08-Sep-2012; Series3: 25-Sep-2013; Series4:  06-Oct-2014; Series5: 14-Sep-2017   PPSV --- Series1: 04-Jun-2014     Assessment  Diabetes mellitus (E11.9)   · Assessed By: Ramiro Rice; Last Assessed: 07 Nov 2018  Dizziness (R42)   · Assessed By: AJ LEE (Primary Care); Last Assessed: 07 Nov 2018    Plan  COMP METABOLIC PANEL WITH CBCA (CPNL,CBCA); Status:Complete;   Done:  07Nov2018 03:26PM  FERRITIN; Status:Complete;   Done: 07Nov2018 03:26PM  IRON AND TIBC; Status:Complete;   Done: 07Nov2018 03:26PM  prc BLOOD SUGAR (ACCUCHECK), IN-OFFICE; Status:Complete;   Done: 07Nov2018  02:55PM  VITAMIN B12/FOLATE; Status:Complete;   Done: 07Nov2018 03:26PM  Plans:     Plan:   Postural dizziness:  no evidence of postural hypotension on exam  in office glucose stable   increase water to maintain hydration  take time when standing from sitting or lying  obtain labs in clinic, will contact pt with results  continue insulin as prescribed-she has a f/u with her primary next week who will follow up on this  for worsening dizziness, pt to f/u sooner.   Medical compliance with plan discussed and risks of non-compliance reviewed.    Patient education completed on disease process, etiology & prognosis.    Patient expresses understanding of the plan.    Proper usage and side effects of medications reviewed & discussed.      Signatures   Electronically signed by : OLIVIA Jackson; Nov 7 2018  2:24PM CST    Electronically signed by : ANUPAMA BAE; Nov 11 2018  3:19PM CST

## 2020-11-11 ENCOUNTER — PATIENT MESSAGE (OUTPATIENT)
Dept: FAMILY MEDICINE | Facility: CLINIC | Age: 85
End: 2020-11-11

## 2020-11-17 ENCOUNTER — OFFICE VISIT (OUTPATIENT)
Dept: FAMILY MEDICINE | Facility: CLINIC | Age: 85
End: 2020-11-17
Payer: MEDICARE

## 2020-11-17 VITALS
OXYGEN SATURATION: 93 % | DIASTOLIC BLOOD PRESSURE: 80 MMHG | BODY MASS INDEX: 17.74 KG/M2 | TEMPERATURE: 99 F | WEIGHT: 93.94 LBS | HEIGHT: 61 IN | SYSTOLIC BLOOD PRESSURE: 112 MMHG | HEART RATE: 103 BPM

## 2020-11-17 DIAGNOSIS — R63.0 ANOREXIA: ICD-10-CM

## 2020-11-17 DIAGNOSIS — J30.1 SEASONAL ALLERGIC RHINITIS DUE TO POLLEN: ICD-10-CM

## 2020-11-17 DIAGNOSIS — I95.1 ORTHOSTATIC HYPOTENSION: Primary | ICD-10-CM

## 2020-11-17 DIAGNOSIS — E78.2 MIXED HYPERLIPIDEMIA: ICD-10-CM

## 2020-11-17 DIAGNOSIS — H10.13 ALLERGIC CONJUNCTIVITIS OF BOTH EYES: ICD-10-CM

## 2020-11-17 PROCEDURE — 99999 PR PBB SHADOW E&M-EST. PATIENT-LVL IV: ICD-10-PCS | Mod: PBBFAC,,, | Performed by: FAMILY MEDICINE

## 2020-11-17 PROCEDURE — 99214 OFFICE O/P EST MOD 30 MIN: CPT | Mod: S$PBB,,, | Performed by: FAMILY MEDICINE

## 2020-11-17 PROCEDURE — 99214 PR OFFICE/OUTPT VISIT, EST, LEVL IV, 30-39 MIN: ICD-10-PCS | Mod: S$PBB,,, | Performed by: FAMILY MEDICINE

## 2020-11-17 PROCEDURE — 99999 PR PBB SHADOW E&M-EST. PATIENT-LVL IV: CPT | Mod: PBBFAC,,, | Performed by: FAMILY MEDICINE

## 2020-11-17 PROCEDURE — 99214 OFFICE O/P EST MOD 30 MIN: CPT | Mod: PBBFAC,PO | Performed by: FAMILY MEDICINE

## 2020-11-17 RX ORDER — LORATADINE 10 MG/1
10 TABLET ORAL DAILY PRN
Qty: 30 TABLET | Refills: 11 | Status: SHIPPED | OUTPATIENT
Start: 2020-11-17 | End: 2021-11-17

## 2020-11-17 RX ORDER — PRAVASTATIN SODIUM 40 MG/1
40 TABLET ORAL NIGHTLY
Qty: 90 TABLET | Refills: 2 | Status: SHIPPED | OUTPATIENT
Start: 2020-11-17 | End: 2021-05-28 | Stop reason: SDUPTHER

## 2020-11-17 NOTE — TELEPHONE ENCOUNTER
No new care gaps identified.  Powered by [employer]. Reference number: [id number]. 11/17/2020 2:43:10 PM   CST

## 2020-11-17 NOTE — PROGRESS NOTES
"Subjective:       Patient ID: Muna Cedillo is a 86 y.o. female.    Chief Complaint: Dizziness and Fatigue    Pt is known to me.  The pt has been having episodes of lightheadedness and weakness when she stands up.  The episodes last a couple of minutes and then she feels better.  Her weight is down 30 pounds since she was having elevated BP.  Her daughter gave her only a half of her Diovan last night and the pt reports she has felt fine today.  The pt is trying to eat and take her time to eat and swallow.  She has been eating much better.  She woke up this morning with pink itchy eyes.  She has been having some nasal congestion and runny nose.    Review of Systems   Constitutional: Negative for activity change, appetite change, fatigue and unexpected weight change.   HENT: Positive for rhinorrhea and trouble swallowing.    Eyes: Positive for itching. Negative for visual disturbance.   Respiratory: Negative for cough, chest tightness and shortness of breath.    Cardiovascular: Negative for chest pain, palpitations and leg swelling.   Gastrointestinal: Negative for abdominal pain, constipation, diarrhea, nausea and vomiting.   Endocrine: Negative for cold intolerance, heat intolerance and polyuria.   Genitourinary: Negative for decreased urine volume and dysuria.   Musculoskeletal: Positive for gait problem. Negative for arthralgias and back pain.   Skin: Negative for rash.   Neurological: Positive for speech difficulty. Negative for numbness and headaches.   Psychiatric/Behavioral: Negative for sleep disturbance.       Objective:       Vitals:    11/17/20 0908   BP: 112/80   BP Location: Right arm   Patient Position: Sitting   Pulse: 103   Temp: 98.6 °F (37 °C)   TempSrc: Oral   SpO2: (!) 93%   Weight: 42.6 kg (93 lb 14.7 oz)   Height: 5' 1" (1.549 m)     Physical Exam  Constitutional:       General: She is not in acute distress.     Appearance: She is well-developed. She is not ill-appearing.      Comments: Pt is " thin   HENT:      Head: Normocephalic.   Eyes:      Conjunctiva/sclera: Conjunctivae normal.      Pupils: Pupils are equal, round, and reactive to light.   Neck:      Musculoskeletal: Normal range of motion and neck supple.      Thyroid: No thyromegaly.   Cardiovascular:      Rate and Rhythm: Normal rate and regular rhythm.      Heart sounds: Normal heart sounds.   Pulmonary:      Effort: Pulmonary effort is normal.      Breath sounds: Normal breath sounds.   Abdominal:      General: Bowel sounds are normal.      Palpations: Abdomen is soft.      Tenderness: There is no abdominal tenderness.   Musculoskeletal: Normal range of motion.         General: No tenderness or deformity.   Lymphadenopathy:      Cervical: No cervical adenopathy.   Skin:     General: Skin is warm and dry.   Neurological:      Mental Status: She is alert and oriented to person, place, and time.      Cranial Nerves: No cranial nerve deficit.      Motor: No abnormal muscle tone.      Coordination: Coordination normal.      Deep Tendon Reflexes: Reflexes normal.      Comments: Expressive aphasia, weak right leg, impaired gait.   Psychiatric:         Mood and Affect: Mood normal.         Behavior: Behavior normal.         Thought Content: Thought content normal.         Assessment:       1. Orthostatic hypotension    2. Seasonal allergic rhinitis due to pollen    3. Allergic conjunctivitis of both eyes    4. Anorexia        Plan:       Muna was seen today for dizziness and fatigue.    Diagnoses and all orders for this visit:    Orthostatic hypotension    Seasonal allergic rhinitis due to pollen  -     loratadine (CLARITIN) 10 mg tablet; Take 1 tablet (10 mg total) by mouth daily as needed for Allergies.    Allergic conjunctivitis of both eyes    Anorexia      During this visit, I reviewed the pt's history, medications, allergies, and problem list.      I recommended medical marijuana edibles for appetite.

## 2020-11-17 NOTE — PROGRESS NOTES
Refill Authorization Note   Muna Cedillo is requesting a refill authorization.  Brief assessment and rationale for refill: Approve     Medication Therapy Plan: CD    Medication reconciliation completed: No   Comments:   Orders Placed This Encounter    pravastatin (PRAVACHOL) 40 MG tablet      Requested Prescriptions   Signed Prescriptions Disp Refills    pravastatin (PRAVACHOL) 40 MG tablet 90 tablet 2     Sig: Take 1 tablet (40 mg total) by mouth every evening.       Cardiovascular:  Antilipid - Statins Passed - 11/17/2020  3:56 PM        Passed - Patient is at least 18 years old        Passed - Office visit in past 12 months or future 90 days     Recent Outpatient Visits            Today Orthostatic hypotension    [facility] GABRIEL Franco MD    Today Otalgia, unspecified laterality    [place] [facility] Leidy Chicas NP    2 weeks ago Dysphagia, unspecified type    [facility] GABRIEL Franco MD    1 month ago Pain in both knees, unspecified chronicity    [facility] [place] Onur Fabian MD    1 month ago Subconjunctival hemorrhage of right eye    [place] - Ophthalmology Iglesia Brownlee Jr., MD          Future Appointments              In 6 days Iglesia Brownlee Jr., MD [place] [facility], [place]                Passed - ALT is 94 or below and within 360 days     ALT   Date Value Ref Range Status   07/20/2020 23 0 - 35 U/L Final   10/06/2018 44 10 - 44 U/L Final   08/20/2018 23 10 - 44 U/L Final              Passed - AST is 54 or below and within 360 days     AST   Date Value Ref Range Status   07/20/2020 39 (H) 14 - 36 U/L Final   10/06/2018 48 (H) 14 - 36 U/L Final   08/20/2018 27 10 - 40 U/L Final              Passed - Total Cholesterol within 360 days     Cholesterol   Date Value Ref Range Status   07/20/2020 235 (H) 120 - 199 mg/dL Final     Comment:     The National Cholesterol Education Program (NCEP) has set the  following guidelines (reference  ranges) for Cholesterol:  Optimal.....................<200 mg/dL  Borderline High.............200-239 mg/dL  High........................> or = 240 mg/dL     08/20/2018 257 (H) 120 - 199 mg/dL Final     Comment:     The National Cholesterol Education Program (NCEP) has set the  following guidelines (reference ranges) for Cholesterol:  Optimal.....................<200 mg/dL  Borderline High.............200-239 mg/dL  High........................> or = 240 mg/dL     09/20/2017 231 (H) 120 - 199 mg/dL Final     Comment:     The National Cholesterol Education Program (NCEP) has set the  following guidelines (reference ranges) for Cholesterol:  Optimal.....................<200 mg/dL  Borderline High.............200-239 mg/dL  High........................> or = 240 mg/dL                Passed - LDL within 360 days     LDL Cholesterol   Date Value Ref Range Status   07/20/2020 127.6 63.0 - 159.0 mg/dL Final     Comment:     The National Cholesterol Education Program (NCEP) has set the  following guidelines (reference values) for LDL Cholesterol:  Optimal.......................<130 mg/dL  Borderline High...............130-159 mg/dL  High..........................160-189 mg/dL  Very High.....................>190 mg/dL              Passed - HDL within 360 days     HDL   Date Value Ref Range Status   07/20/2020 83 (H) 40 - 75 mg/dL Final     Comment:     The National Cholesterol Education Program (NCEP) has set the  following guidelines (reference values) for HDL Cholesterol:  Low...............<40 mg/dL  Optimal...........>60 mg/dL              Passed - Triglycerides within 360 days     Triglycerides   Date Value Ref Range Status   07/20/2020 122 30 - 150 mg/dL Final     Comment:     The National Cholesterol Education Program (NCEP) has set the  following guidelines (reference values) for triglycerides:  Normal......................<150 mg/dL  Borderline High.............150-199 mg/dL  High........................200-499  mg/dL     08/20/2018 80 30 - 150 mg/dL Final     Comment:     The National Cholesterol Education Program (NCEP) has set the  following guidelines (reference values) for triglycerides:  Normal......................<150 mg/dL  Borderline High.............150-199 mg/dL  High........................200-499 mg/dL     09/20/2017 71 30 - 150 mg/dL Final     Comment:     The National Cholesterol Education Program (NCEP) has set the  following guidelines (reference values) for triglycerides:  Normal......................<150 mg/dL  Borderline High.............150-199 mg/dL  High........................200-499 mg/dL                    Appointments  past 12m or future 3m with PCP    Date Provider   Last Visit   11/17/2020 GABRIEL Franco MD   Next Visit   Visit date not found GABRIEL Franco MD   ED visits in past 90 days: 0     Note composed:3:56 PM 11/17/2020

## 2020-11-19 ENCOUNTER — PATIENT MESSAGE (OUTPATIENT)
Dept: FAMILY MEDICINE | Facility: CLINIC | Age: 85
End: 2020-11-19

## 2020-11-20 ENCOUNTER — TELEPHONE (OUTPATIENT)
Dept: NEUROLOGY | Facility: CLINIC | Age: 85
End: 2020-11-20

## 2020-11-20 NOTE — TELEPHONE ENCOUNTER
----- Message from Yolette Mayer sent at 11/20/2020 11:55 AM CST -----  Regarding: appt access  Contact: daughter  Daughter would like to schedule an appt for pt. History of stroke, experiencing dizziness and weakness. Daughter can be reached at [phone]

## 2020-11-20 NOTE — TELEPHONE ENCOUNTER
Patient scheduled for a new patient appointment on 11/24/2020 with Dr. Bullock. Daughter advised and expressed understanding.

## 2020-11-23 ENCOUNTER — TELEPHONE (OUTPATIENT)
Dept: OPHTHALMOLOGY | Facility: CLINIC | Age: 85
End: 2020-11-23

## 2020-11-23 NOTE — TELEPHONE ENCOUNTER
Called pt to reschedule her missed appt with Dr Brownlee. Spoke with pt's daughter and was told that pt has other health issues and they will call back to reschedule at a later date.

## 2020-11-24 ENCOUNTER — OFFICE VISIT (OUTPATIENT)
Dept: NEUROLOGY | Facility: CLINIC | Age: 85
End: 2020-11-24
Payer: MEDICARE

## 2020-11-24 ENCOUNTER — TELEPHONE (OUTPATIENT)
Dept: SURGERY | Facility: CLINIC | Age: 85
End: 2020-11-24

## 2020-11-24 ENCOUNTER — TELEPHONE (OUTPATIENT)
Dept: NEUROLOGY | Facility: CLINIC | Age: 85
End: 2020-11-24

## 2020-11-24 VITALS
RESPIRATION RATE: 16 BRPM | HEIGHT: 61 IN | HEART RATE: 88 BPM | DIASTOLIC BLOOD PRESSURE: 61 MMHG | BODY MASS INDEX: 17.75 KG/M2 | TEMPERATURE: 98 F | SYSTOLIC BLOOD PRESSURE: 137 MMHG

## 2020-11-24 DIAGNOSIS — R47.1 DYSARTHRIA: ICD-10-CM

## 2020-11-24 DIAGNOSIS — R53.1 WEAKNESS: Primary | ICD-10-CM

## 2020-11-24 DIAGNOSIS — R13.10 DYSPHAGIA, UNSPECIFIED TYPE: ICD-10-CM

## 2020-11-24 PROCEDURE — 99999 PR PBB SHADOW E&M-EST. PATIENT-LVL V: ICD-10-PCS | Mod: PBBFAC,,, | Performed by: PSYCHIATRY & NEUROLOGY

## 2020-11-24 PROCEDURE — 99205 OFFICE O/P NEW HI 60 MIN: CPT | Mod: S$PBB,,, | Performed by: PSYCHIATRY & NEUROLOGY

## 2020-11-24 PROCEDURE — 99999 PR PBB SHADOW E&M-EST. PATIENT-LVL V: CPT | Mod: PBBFAC,,, | Performed by: PSYCHIATRY & NEUROLOGY

## 2020-11-24 PROCEDURE — 99205 PR OFFICE/OUTPT VISIT, NEW, LEVL V, 60-74 MIN: ICD-10-PCS | Mod: S$PBB,,, | Performed by: PSYCHIATRY & NEUROLOGY

## 2020-11-24 PROCEDURE — 99215 OFFICE O/P EST HI 40 MIN: CPT | Mod: PBBFAC,PN | Performed by: PSYCHIATRY & NEUROLOGY

## 2020-11-24 NOTE — PROGRESS NOTES
NEUROLOGY  Outpatient CONSULT    [facility]  [address], [place] [address]  [phone] (office) / [phone] (fax)    Patient Name:  Muna Cedillo  :  1934  MR #:  [id number]  Acct #:  [id number]    Date of Neurology Consult: 2020  Name of Neurologist: Lulu Bullock D.O, ABPN, AOBNP, ABEM    Other Physicians:  SWAPNA Franco MD (Primary Care Physician); Self, Aaareferral (Referring)      Chief Complaint: Dysphagia      History of Present Illness (HPI):  Muna Cedillo is a 86 y.o. female who presents via self referral for another opinion regarding dysphagia.    Last Nov was having dyspnea with short walks.  She was seen by a heart specialist and thought she had blockages in the heart. She had stents placed.  The heart doctor questioned if she had a stroke due to right facial weakness and slurred speech.  She was seen by a neurologist who determined she had a stroke based on MRI brain.  She was started on therapy. At that time she was independent.  Since then she has progressively declined.  She has had a couple of MRIs of her brain since, the last in August, to see if she was having more strokes.  This was found to be negative.  The shortness of breath improved after the cardiac stents.    She is not progressing with therapy.  She has difficulty swallowing and eating.  This has progressively been worse since last year.  Now she is very difficult to understand.      She has lost a lot of weight.  She has lost about 30# since last year.  She has discussed getting a feeding tube due to this and weakness.  It takes her a very long time to eat, she cannot get enough calories in during the day.  She is getting a feeding tube placed in December.  When she swallows she will cough or choke.  She is on a modified diet - baby food texture.    Her legs are much weaker.  She is not walking much.  The right leg is worse.  She has no numbness or tingling.      She can lay flat  on her back at night to sleep with no difficulties.     She does not have laughing or crying that seems out of the ordinary.    She has no muscle twitches or cramps.    They have not noted any cognitive issues.    She is in speech therapy twice a week.  They feel at times she is better.  She has not been assessed for a speech assistance device.    Her brother was diagnosed with Myasthenia Gravis     Treatment to date:    Speech therapy - vocal cord stimulation      Review of Systems:   General: Weight gain: No, Weight Loss: Yes, Fatigue: Yes,   Fever: No, Chills: No, Night Sweats: No, Insomnia: No, Excessive sleeping: No   Respiratory:  Cough: No, Shortness of Breath: No,   Wheezing: No, Excessive Snoring: No, Coughing up blood: No  Endocrine: Heat Intolerance: No, Cold Intolerance: Yes,   Excessive Thirst: No, Excessive Hunger: No,   Eyes:  Blurred Vision: No, Double Vision: No,   Light Sensitivity: No, Eye pain: No  Musculoskeletal: Muscle Aches/Pain: No, Joint Pain/Swelling: No, Muscle Cramps: No, Muscle Weakness: Yes, Neck Pain: No, Back Pain: No   Neurological: Difficulty Walking/Falls: Yes, Headache Migraine: No, Dizziness/Vertigo: Yes, Fainting: No, Difficulty with Speech: Yes, Weakness: Yes, Tingling/Numbness: No, Tremors: No, Memory Problems: No, Seizures: No, Difficulty Swallowing: Yes, Altered Taste: No.  Cardiovascular: Chest Pain: No, Shortness of Breath: No,   Palpitations: No,  Gastrointestinal: Nausea/Vomiting: No, Constipation: No, Diarrhea: No, Bloody Stools: No   Psych/Cog:  Depression: No, Anxiety: No, Hallucinations: No, Problems Concentrating: No  : Frequent Urination: No, Incontinence: No, Blood of Urine: No, Urinary Infections: No, Changes in Sex Drive: No   ENT:Hearing Loss: No, Earache: Yes, Ringing in Ears: No,   Facial Pain: No, Chronic Congestion: No   Immune: Seasonal Allergies: No, Hives and/or Rashes: No  The remainder of the review of twelve body systems was reviewed and  normal.    Past Medical, Surgical, Family & Social History:   Past Medical History:   Diagnosis Date    Arthritis     back pain    Breast cancer 1998    was treated with radiation    Cataract     Diverticulosis     Hypertension     Shingles     Stroke ~11/2019     Past Surgical History:   Procedure Laterality Date    BREAST BIOPSY Right     malignant    BREAST LUMPECTOMY Right     malignant    COLONOSCOPY  05/05/2014    Dr. Ponce, in media: hemorrhoids, diverticulosis, no repeat colonoscopy for screening recommended    CORONARY STENT PLACEMENT  11/2019    HYSTERECTOMY      partial hysterectomy, has both ovaries    OTHER SURGICAL HISTORY      Decompression of Nerve in the Back     Family History   Problem Relation Age of Onset    Colon cancer Mother         unsure of age of diagnosis    Cancer Brother         brain tumor    Myasthenia gravis Brother     Crohn's disease Neg Hx     Ulcerative colitis Neg Hx     Stomach cancer Neg Hx     Esophageal cancer Neg Hx      Alcohol use:  reports no history of alcohol use.   (Of note, 0.6 oz = 1 beer or 6 oz = 10 beers).  Tobacco use:  reports that she has never smoked. She has never used smokeless tobacco.  Street drug use:  reports no history of drug use.  Allergies: Patient has no known allergies..    Home Medications:     Current Outpatient Medications:     acetaminophen (TYLENOL) 325 MG tablet, Take 325 mg by mouth as needed for Pain., Disp: , Rfl:     clopidogrel (PLAVIX) 75 mg tablet, 1 tablet, Disp: , Rfl:     clotrimazole-betamethasone 1-0.05% (LOTRISONE) cream, Apply topically 2 (two) times daily., Disp: 45 g, Rfl: 2    loratadine (CLARITIN) 10 mg tablet, Take 1 tablet (10 mg total) by mouth daily as needed for Allergies., Disp: 30 tablet, Rfl: 11    nystatin (MYCOSTATIN) powder, Apply topically 4 (four) times daily., Disp: 60 g, Rfl: 5    pantoprazole (PROTONIX) 20 MG tablet, TAKE 1 TABLET(20 MG) BY MOUTH BEFORE BREAKFAST, Disp: 30 tablet,  "Rfl: 3    pravastatin (PRAVACHOL) 40 MG tablet, Take 1 tablet (40 mg total) by mouth every evening., Disp: 90 tablet, Rfl: 2    acetaminophen with codeine (ACETAMINOPHEN-CODEINE) 120mg 12mg 5mL Soln, 5 to 10 ml by mouth every 6 hours as needed for pain, Disp: 240 mL, Rfl: 1    cyproheptadine (PERIACTIN) 4 mg tablet, Take 1 tablet (4 mg total) by mouth 2 (two) times daily before meals. For appetite, Disp: 60 tablet, Rfl: 1    valsartan (DIOVAN) 80 MG tablet, , Disp: , Rfl:     Physical Examination:  /61 (BP Location: Left arm, Patient Position: Sitting, BP Method: Medium (Automatic))   Pulse 88   Temp 98.1 °F (36.7 °C)   Resp 16   Ht 5' 1" (1.549 m)   BMI 17.75 kg/m²     GENERAL:  General appearance: Well, non-toxic appearing.  No apparent distress.  Cachectic.  Fundi exam: normal.  Neck: supple.  Carotid auscultation: normal.  Heart auscultation: normal.  Peripheral pulses: normal.  Extremities: normal.    MENTAL STATUS:  Alertness, attention span & concentration: normal. Some spontaneous laughter.  Language: normal.  Orientation to self, place & time:  normal.  Memory, recent & remote: normal.  Fund of knowledge: normal.    SPEECH:  Clear and fluent.  Follows complex commands.    CRANIAL NERVES:  Cranial Nerves II-XII were examined.  II - Visual fields: normal.  III, IV, VI: PERRL, EOMI, No ptosis, No nystagmus.  V - Facial sensation: normal.  VII - Face symmetry & mobility: slightly reduced movement in bilateral lower face.  VIII - Hearing: normal.  IX, X - Palate: no palate movement at all.  XI - Shoulder shrug: normal.  XII - Tongue protrusion: no movement in any direction, atrophy present throughout.    GROSS MOTOR:  Gait & station: too weak to ambulate independent, bilateral foot drop.  Tone: increased in BLE.  Abnormal movements: none.  Finger-nose & Heel-knee-shin: normal.  Rapid alternating movements & drift: normal.    MUSCLE STRENGTH:     Fascics Atrophy RIGHT    LEFT Atrophy Fascics     5 " "Neck Ext. 5       5 Neck Flex 5       5 Deltoids 5       5 Sh.Ext.Rot. 5       5 Sh.Int.Rot. 5       5 Biceps 5       5 Triceps 5       5 Forearm.Pr. 5       5 Wrist Ext. 5       5 Wrist Flex 5       5 Finger Ext. 5       5 Finger Flex 5       5 FPL 5       5- Inteross. 5-                         4 Iliopsoas 4       4 Hip Abduct 4       4 Hip Adduct 4       4 Quads 5       4 Hams 4       2 Dorsiflex 3       4 Plantar Flex 4                         REFLEXES:    RIGHT Reflex   LEFT   3 Biceps 3   3 Brachiorad. 3   3 Triceps 3   + Pectoralis +   + Jaw Jerk +   - Paulson's -        3 Patellar 3   3 Ankle 3   + Suprapatellar +             Down PLANTAR Down       SENSORY:  Light touch: Normal throughout.  Sharp touch: Normal throughout.      Diagnostic Data Reviewed:   MRI brain 5/28/20:  1. No acute intracranial abnormalities identified.  2. Left frontal encephalomalacia compatible with chronic infarct unchanged.  3. Chronic involutional changes and severe chronic white matter microischemic change.      MRA brain 5/28/20:  No high-grade stenosis, aneurysm, or dissection identified.    Assessment and Plan:  Muna Cedillo is a 86 y.o., right handed woman with progressive dysphagia, weakness, dysarthria and weight loss of unclear etiology.  She was originally diagnosed with stroke.  I do not have her original records, these will be requested.  Reviewing her MRI brain from May, there is evidence of encephalomalacia in the left frontal lobe as well as extensive white matter changes in both hemispheres as well as the brainstem.  Unclear where her "new stroke" occurred that was felt to initiate her symptoms.    Her current films are not showing a great explanation for her swallowing dysfunction.  This does raise concern for an alternative diagnosis such as a neuromuscular condition.    Will get bloodwork today to look for evidence of muscle inflammation or breakdown.    Will get an EMG to look for lower motor neuron sources " or neuromuscular junction sources of her dysphagia.    Will request records from her initial workups.    The patient will return to clinic after testing.    Important to note, also  has a past medical history of Arthritis, Breast cancer (1998), Cataract, Diverticulosis, Hypertension, Shingles, and Stroke (~11/2019).           Lulu Bullock D.O, ABPN, AOBNP, ABEM    This note was created with voice recognition software.  Grammatical, syntax and spelling errors may be inevitable.

## 2020-11-24 NOTE — TELEPHONE ENCOUNTER
----- Message from [place] sent at 11/24/2020 10:52 AM CST -----  Contact: Emely  Type: Needs Medical Advice  Who Called:  Emely with [facility]  Symptoms (please be specific):  Dysphagia  How long has patient had these symptoms:    Pharmacy name and phone #:    Best Call Back Number:   Additional Information: called to advise that patient needs to be seen soon as possible. First appt 12/14 requesting a call back today to confirm appt

## 2020-11-24 NOTE — PATIENT INSTRUCTIONS
Will plan to get an EMG on 12/14/20 at 2:30 at the [address] building on the 2nd floor at [facility] waiting area for evaluation of the muscles and nerves.  Do not wear any lotions or oils that day. Deoderant and perfume are fine.    Will get blood work to look for autoimmune conditions, deficiencies or toxicities that might cause these symptoms.

## 2020-11-25 ENCOUNTER — LAB VISIT (OUTPATIENT)
Dept: LAB | Facility: HOSPITAL | Age: 85
End: 2020-11-25
Attending: PSYCHIATRY & NEUROLOGY
Payer: MEDICARE

## 2020-11-25 ENCOUNTER — TELEPHONE (OUTPATIENT)
Dept: FAMILY MEDICINE | Facility: CLINIC | Age: 85
End: 2020-11-25

## 2020-11-25 DIAGNOSIS — R53.1 WEAKNESS: ICD-10-CM

## 2020-11-25 DIAGNOSIS — R47.1 DYSARTHRIA: ICD-10-CM

## 2020-11-25 DIAGNOSIS — R13.10 DYSPHAGIA, UNSPECIFIED TYPE: ICD-10-CM

## 2020-11-25 LAB — CK SERPL-CCNC: 42 U/L (ref 20–180)

## 2020-11-25 PROCEDURE — 36415 COLL VENOUS BLD VENIPUNCTURE: CPT | Mod: PO

## 2020-11-25 PROCEDURE — 82550 ASSAY OF CK (CPK): CPT

## 2020-11-25 NOTE — TELEPHONE ENCOUNTER
Spoke with Victor M TSE nurse. He wanted to verify patient was to d/c her diovan. Stated she has not been taking it and her BP is good 124/70 doing much better. Verbalized understanding.

## 2020-11-25 NOTE — TELEPHONE ENCOUNTER
----- Message from Kristina Pugh sent at 11/25/2020 10:00 AM CST -----  Regarding:  Nurse Victor M  Contact: [phone]  Type: Needs Medical Advice  Who Called:  Victor M Mayorga Call Back Number: [phone]  Additional Information: Nurse is requesting for a nurse to call him back to verify if patient was taken off meds valsartan (DIOVAN) 80 MG tablet

## 2020-12-01 LAB
AMPHIPHYSIN AB TITR SER: NEGATIVE TITER
CV2 IGG TITR SER: NEGATIVE TITER
GLIAL NUC TYPE 1 AB TITR SER: NEGATIVE TITER
HU1 AB TITR SER: NEGATIVE TITER
HU2 AB TITR SER IF: NEGATIVE TITER
HU3 AB TITR SER: NEGATIVE TITER
IMMUNOLOGIST REVIEW: NORMAL
NACHR AB SER-SCNC: 0 NMOL/L
PAVAL REFLEX TEST ADDED: NORMAL
PCA-1 AB TITR SER: NEGATIVE TITER
PCA-2 AB TITR SER: NEGATIVE TITER
PCA-TR AB TITR SER: NEGATIVE TITER
STRIA MUS AB TITR SER: NEGATIVE TITER
VGCC-N BIND AB SER-SCNC: 0 NMOL/L
VGCC-P/Q BIND AB SER-SCNC: 0 NMOL/L
VGKC AB SER-SCNC: 0 NMOL/L

## 2020-12-04 ENCOUNTER — DOCUMENT SCAN (OUTPATIENT)
Dept: HOME HEALTH SERVICES | Facility: HOSPITAL | Age: 85
End: 2020-12-04
Payer: MEDICARE

## 2020-12-08 ENCOUNTER — DOCUMENT SCAN (OUTPATIENT)
Dept: HOME HEALTH SERVICES | Facility: HOSPITAL | Age: 85
End: 2020-12-08
Payer: MEDICARE

## 2020-12-14 ENCOUNTER — PROCEDURE VISIT (OUTPATIENT)
Dept: NEUROLOGY | Facility: CLINIC | Age: 85
End: 2020-12-14
Payer: MEDICARE

## 2020-12-14 DIAGNOSIS — R13.10 DYSPHAGIA, UNSPECIFIED TYPE: ICD-10-CM

## 2020-12-14 DIAGNOSIS — M54.16 LUMBAR RADICULOPATHY: ICD-10-CM

## 2020-12-14 DIAGNOSIS — G60.9 IDIOPATHIC PERIPHERAL NEUROPATHY: ICD-10-CM

## 2020-12-14 DIAGNOSIS — R53.1 WEAKNESS: ICD-10-CM

## 2020-12-14 DIAGNOSIS — R47.1 DYSARTHRIA: ICD-10-CM

## 2020-12-14 DIAGNOSIS — G56.01 RIGHT CARPAL TUNNEL SYNDROME: Primary | ICD-10-CM

## 2020-12-14 PROCEDURE — 95911 NRV CNDJ TEST 9-10 STUDIES: CPT | Mod: 26,S$PBB,, | Performed by: PSYCHIATRY & NEUROLOGY

## 2020-12-14 PROCEDURE — 95937 PR NEUROMUSCULAR JUNCTION TEST: ICD-10-PCS | Mod: 26,S$PBB,, | Performed by: PSYCHIATRY & NEUROLOGY

## 2020-12-14 PROCEDURE — 95886 MUSC TEST DONE W/N TEST COMP: CPT | Mod: 26,S$PBB,, | Performed by: PSYCHIATRY & NEUROLOGY

## 2020-12-14 PROCEDURE — 95911 PR NERVE CONDUCTION STUDY; 9-10 STUDIES: ICD-10-PCS | Mod: 26,S$PBB,, | Performed by: PSYCHIATRY & NEUROLOGY

## 2020-12-14 PROCEDURE — 95887 MUSC TST DONE W/N TST NONEXT: CPT | Mod: 26,59,S$PBB, | Performed by: PSYCHIATRY & NEUROLOGY

## 2020-12-14 PROCEDURE — 95887 PR MUSC TST DONE W/N TST NONEXT: ICD-10-PCS | Mod: 26,59,S$PBB, | Performed by: PSYCHIATRY & NEUROLOGY

## 2020-12-14 PROCEDURE — 95887 MUSC TST DONE W/N TST NONEXT: CPT | Mod: 59,PBBFAC,PO | Performed by: PSYCHIATRY & NEUROLOGY

## 2020-12-14 PROCEDURE — 95886 MUSC TEST DONE W/N TEST COMP: CPT | Mod: PBBFAC,PO | Performed by: PSYCHIATRY & NEUROLOGY

## 2020-12-14 PROCEDURE — 95937 NEUROMUSCULAR JUNCTION TEST: CPT | Mod: 26,S$PBB,, | Performed by: PSYCHIATRY & NEUROLOGY

## 2020-12-14 PROCEDURE — 95886 PR EMG COMPLETE, W/ NERVE CONDUCTION STUDIES, 5+ MUSCLES: ICD-10-PCS | Mod: 26,S$PBB,, | Performed by: PSYCHIATRY & NEUROLOGY

## 2020-12-14 PROCEDURE — 95937 NEUROMUSCULAR JUNCTION TEST: CPT | Mod: PBBFAC,PO | Performed by: PSYCHIATRY & NEUROLOGY

## 2020-12-14 PROCEDURE — 95911 NRV CNDJ TEST 9-10 STUDIES: CPT | Mod: PBBFAC,PO | Performed by: PSYCHIATRY & NEUROLOGY

## 2020-12-16 ENCOUNTER — PATIENT MESSAGE (OUTPATIENT)
Dept: FAMILY MEDICINE | Facility: CLINIC | Age: 85
End: 2020-12-16

## 2020-12-16 ENCOUNTER — PATIENT MESSAGE (OUTPATIENT)
Dept: NEUROLOGY | Facility: CLINIC | Age: 85
End: 2020-12-16

## 2020-12-16 DIAGNOSIS — I69.391 DYSPHAGIA AS LATE EFFECT OF CEREBROVASCULAR ACCIDENT (CVA): ICD-10-CM

## 2020-12-16 DIAGNOSIS — R63.4 WEIGHT LOSS: ICD-10-CM

## 2020-12-16 DIAGNOSIS — I63.319 CEREBROVASCULAR ACCIDENT (CVA) DUE TO THROMBOSIS OF MIDDLE CEREBRAL ARTERY, UNSPECIFIED BLOOD VESSEL LATERALITY: Primary | ICD-10-CM

## 2020-12-18 ENCOUNTER — OFFICE VISIT (OUTPATIENT)
Dept: OTOLARYNGOLOGY | Facility: CLINIC | Age: 85
End: 2020-12-18
Payer: MEDICARE

## 2020-12-18 ENCOUNTER — PATIENT MESSAGE (OUTPATIENT)
Dept: FAMILY MEDICINE | Facility: CLINIC | Age: 85
End: 2020-12-18

## 2020-12-18 VITALS — BODY MASS INDEX: 17.74 KG/M2 | HEIGHT: 61 IN | WEIGHT: 93.94 LBS

## 2020-12-18 DIAGNOSIS — R13.10 DYSPHAGIA, UNSPECIFIED TYPE: ICD-10-CM

## 2020-12-18 DIAGNOSIS — R47.01 EXPRESSIVE APHASIA: Primary | ICD-10-CM

## 2020-12-18 DIAGNOSIS — Z86.73 STATUS POST CVA: ICD-10-CM

## 2020-12-18 PROCEDURE — 99214 OFFICE O/P EST MOD 30 MIN: CPT | Mod: 25,S$PBB,, | Performed by: NURSE PRACTITIONER

## 2020-12-18 PROCEDURE — 99213 OFFICE O/P EST LOW 20 MIN: CPT | Mod: PBBFAC,PO | Performed by: NURSE PRACTITIONER

## 2020-12-18 PROCEDURE — 99999 PR PBB SHADOW E&M-EST. PATIENT-LVL III: CPT | Mod: PBBFAC,,, | Performed by: NURSE PRACTITIONER

## 2020-12-18 PROCEDURE — 31575 DIAGNOSTIC LARYNGOSCOPY: CPT | Mod: S$PBB,,, | Performed by: NURSE PRACTITIONER

## 2020-12-18 PROCEDURE — 99214 PR OFFICE/OUTPT VISIT, EST, LEVL IV, 30-39 MIN: ICD-10-PCS | Mod: 25,S$PBB,, | Performed by: NURSE PRACTITIONER

## 2020-12-18 PROCEDURE — 31575 DIAGNOSTIC LARYNGOSCOPY: CPT | Mod: PBBFAC,PO | Performed by: NURSE PRACTITIONER

## 2020-12-18 PROCEDURE — 31575 PR LARYNGOSCOPY, FLEXIBLE; DIAGNOSTIC: ICD-10-PCS | Mod: S$PBB,,, | Performed by: NURSE PRACTITIONER

## 2020-12-18 PROCEDURE — 99999 PR PBB SHADOW E&M-EST. PATIENT-LVL III: ICD-10-PCS | Mod: PBBFAC,,, | Performed by: NURSE PRACTITIONER

## 2020-12-18 RX ORDER — VALSARTAN 80 MG/1
TABLET ORAL
COMMUNITY
End: 2021-01-13

## 2020-12-18 RX ORDER — PRAVASTATIN SODIUM 40 MG/1
TABLET ORAL
COMMUNITY
End: 2021-07-29

## 2020-12-18 RX ORDER — CLOPIDOGREL BISULFATE 75 MG/1
75 TABLET ORAL DAILY
COMMUNITY

## 2020-12-18 RX ORDER — OMEPRAZOLE 40 MG/1
CAPSULE, DELAYED RELEASE ORAL
COMMUNITY
End: 2020-12-22

## 2020-12-18 NOTE — PROGRESS NOTES
Subjective:       Patient ID: Muna Cedillo is a 86 y.o. female.    Chief Complaint: Ear Fullness and Other (speech therapist rec doing scope bc of speech changes, and choking easily while eating)    Ear Fullness   Pertinent negatives include no coughing, rhinorrhea or sore throat.      Patient was seen by me 14 months ago after two-month h/o dry cough related to LPRD. Patient had a stroke in 11/2019, sequelae of expressive aphasia and dysphagia.  She gets choked easily.  She has not had an aspiration pneumonia.  She has been seeing a SLP, undergoing rigorous speech and swallowing exercises; however she had continued to decline in her progress.  She has difficulty swallowing and coughing effectively. She pretty much only tolerates liquid diet at this point and has been losing weight and is currently being considered for PEG tube. Patient had back surgery 4-5 months ago for decompression of nerve. Since then, her voice has changed so her speech pathologist recommended she see ENT for laryngoscopy examination.      Review of Systems   Constitutional: Negative.    HENT: Positive for trouble swallowing and voice change. Negative for congestion, dental problem, facial swelling, postnasal drip, rhinorrhea, sinus pressure, sinus pain, sneezing and sore throat.         Frequent throat clearing  Frequent throat irritation   Eyes: Positive for redness and itching. Negative for discharge.   Respiratory: Positive for choking. Negative for cough.    Cardiovascular: Negative.    Gastrointestinal: Negative.    Musculoskeletal: Negative.    Skin: Negative.    Neurological: Negative.    Hematological: Negative.    Psychiatric/Behavioral: Negative.        Objective:      Physical Exam  Vitals signs and nursing note reviewed.   Constitutional:       General: She is not in acute distress.     Appearance: She is well-developed. She is not ill-appearing or diaphoretic.   HENT:      Head: Normocephalic and atraumatic.      Right Ear:  Hearing, tympanic membrane, ear canal and external ear normal. No middle ear effusion. Tympanic membrane is not erythematous.      Left Ear: Hearing, tympanic membrane, ear canal and external ear normal.  No middle ear effusion. Tympanic membrane is not erythematous.      Nose: Nose normal. No mucosal edema or rhinorrhea.      Mouth/Throat:      Mouth: Mucous membranes are not pale, dry and not cyanotic. No oral lesions.      Tongue: No lesions.      Pharynx: Uvula midline.     Eyes:      General: Lids are normal. No scleral icterus.        Right eye: No discharge.         Left eye: No discharge.      Conjunctiva/sclera: Conjunctivae normal.      Pupils: Pupils are equal, round, and reactive to light.   Neck:      Musculoskeletal: Normal range of motion and neck supple.      Thyroid: No thyroid mass or thyromegaly.      Trachea: Trachea normal. No tracheal deviation.   Cardiovascular:      Rate and Rhythm: Normal rate.   Pulmonary:      Effort: Pulmonary effort is normal. No respiratory distress.      Breath sounds: No stridor. No wheezing.   Musculoskeletal: Normal range of motion.   Lymphadenopathy:      Head:      Right side of head: No submental, submandibular, tonsillar, preauricular or posterior auricular adenopathy.      Left side of head: No submental, submandibular, tonsillar, preauricular or posterior auricular adenopathy.      Cervical: No cervical adenopathy.      Right cervical: No superficial or posterior cervical adenopathy.     Left cervical: No superficial or posterior cervical adenopathy.   Skin:     General: Skin is warm and dry.      Coloration: Skin is not pale.      Findings: No lesion or rash.   Neurological:      Mental Status: She is alert and oriented to person, place, and time.      Coordination: Coordination normal.      Gait: Gait normal.   Psychiatric:         Speech: Speech normal.         Behavior: Behavior normal. Behavior is cooperative.         Thought Content: Thought content  normal.         Judgment: Judgment normal.         Procedure: Flexible laryngoscopy    In order to fully examine the upper aerodigestive tract, including the larynx, in a patient with a hyperactive gag reflex, and suboptimal visualization with indirect mirror exam,  flexible endoscopy is required.   After explaining the procedure and obtaining verbal consent, a timeout was performed with the patient's participation according to the universal protocol. Both nasal cavities were anesthetized with 4% Xylocaine spray mixed with Nicanor-Synephrine. The flexible laryngoscope  was inserted into the nasal cavity and advanced to visualize the nasal cavity, nasopharynx, the posterior oropharynx, hypopharynx, and the endolarynx with the  findings noted. The scope was removed and the procedure terminated. The patient tolerated this procedure well without apparent complication.     OVERALL FINDINGS  Nasopharynx - the torus is clear. There are no lesions of the posterior wall.   Oropharynx - no lesions of the tongue base. There is no obvious fullness or asymmetry.  Hypopharynx - there are no lesions of the pyriform sinuses or postcricoid region   Larynx - there are no lesions of the supraglottic or glottic larynx.  Vocal fold mobility is normal.     SPECIFIC FINDINGS  Adenoid tissue - normal   Nasopharynx & eustachian tube orifices - normal   Posterior pharyngeal wall - normal   Base of tongue - normal   Epiglottis - normal   Valleculae - normal   Pyriform sinuses - normal   False vocal cords - normal   True vocal cords - normal  Arytenoids - normal   Interarytenoid space - normal    Assessment:     Aphasia and dysphagia following CVA     Weak neuromuscular function or pharyngeal and parapharyngeal muscles, weak and ineffective swallow, unable to clear any of the dissolved food in her throat or top of esophagus, and unable to completely adduct or phonate  High risk for aspiration  Plan:     Will confer with Dr. No and call  daughter if he makes any recommendations.   Discussed will likely need PEG for nutrition.     TVC mobility is symmetric albeit weak. Daughter states her mother's condition has continued to decline despite rigorous SLP therapy which may suggest a degenerative neuromuscular condition in addition to CVA sequelae.

## 2020-12-18 NOTE — Clinical Note
Please let me know if you can suggest any further recommendations as I will reach out to her daughter

## 2020-12-21 DIAGNOSIS — Z79.01 ANTICOAGULANT LONG-TERM USE: ICD-10-CM

## 2020-12-21 DIAGNOSIS — Z87.19 HISTORY OF GASTROESOPHAGEAL REFLUX (GERD): ICD-10-CM

## 2020-12-21 NOTE — TELEPHONE ENCOUNTER
Received refill request for protonix. Patient is overdue for a follow-up visit (last seen a year ago). Also, I reviewed medication list on file and see omeprazole is also on list (patient does not need to be on both of these since they are both PPIs). Please clarify with patient to see if she still taking protonix and schedule patient for a follow-up appointment with one of the GI physicians (noted in 12/18/2020 ENT visit note that they are considering feeding tube placement; seeing Dr. Denson on 12/24/2020 for consult for feeding tube placement).  Thanks  LEONARD

## 2020-12-21 NOTE — TELEPHONE ENCOUNTER
Informed pt's daughter of medication refill and follow up appt. Pt's daughter stated that they would call back to scheduled. Pt's daughter stated that her mother is no longer taking the omeprazole. Provider informed.

## 2020-12-22 RX ORDER — PANTOPRAZOLE SODIUM 20 MG/1
TABLET, DELAYED RELEASE ORAL
Qty: 30 TABLET | Refills: 0 | Status: SHIPPED | OUTPATIENT
Start: 2020-12-22 | End: 2021-01-05

## 2020-12-24 ENCOUNTER — TELEPHONE (OUTPATIENT)
Dept: GASTROENTEROLOGY | Facility: CLINIC | Age: 85
End: 2020-12-24

## 2020-12-24 ENCOUNTER — TELEPHONE (OUTPATIENT)
Dept: SURGERY | Facility: CLINIC | Age: 85
End: 2020-12-24

## 2020-12-24 NOTE — TELEPHONE ENCOUNTER
Spoke with pt daughter and scheduled next available appt with NP. Pt daughter agreed and verbalized understanding.

## 2020-12-24 NOTE — TELEPHONE ENCOUNTER
Spoke to Ms. Garcia, confirmed appointment was for PEG placement, explained Dr Denson does not place PEG tubes and would likely need a GI. States her understanding and will call back prn.

## 2020-12-24 NOTE — TELEPHONE ENCOUNTER
----- Message from Liana Melton sent at 12/24/2020  8:12 AM CST -----  Regarding: Sooner Appt  Contact: pt mary Tripp  Type:  Sooner Apoointment Request    Caller is requesting a sooner appointment.  Caller declined first available appointment listed below.  Caller will not accept being placed on the waitlist and is requesting a message be sent to doctor.    Name of Caller:  pt mary Tripp  When is the first available appointment?  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 please call to advise asking to be seen ASAP

## 2021-01-04 ENCOUNTER — IMMUNIZATION (OUTPATIENT)
Dept: FAMILY MEDICINE | Facility: CLINIC | Age: 86
End: 2021-01-04
Payer: MEDICARE

## 2021-01-04 ENCOUNTER — TELEPHONE (OUTPATIENT)
Dept: FAMILY MEDICINE | Facility: CLINIC | Age: 86
End: 2021-01-04

## 2021-01-04 DIAGNOSIS — Z23 NEED FOR VACCINATION: ICD-10-CM

## 2021-01-04 PROCEDURE — 91300 COVID-19, MRNA, LNP-S, PF, 30 MCG/0.3 ML DOSE VACCINE: CPT | Mod: PBBFAC,PO

## 2021-01-05 ENCOUNTER — OFFICE VISIT (OUTPATIENT)
Dept: NEUROLOGY | Facility: CLINIC | Age: 86
End: 2021-01-05
Payer: MEDICARE

## 2021-01-05 ENCOUNTER — LAB VISIT (OUTPATIENT)
Dept: LAB | Facility: HOSPITAL | Age: 86
End: 2021-01-05
Attending: PSYCHIATRY & NEUROLOGY
Payer: MEDICARE

## 2021-01-05 ENCOUNTER — OFFICE VISIT (OUTPATIENT)
Dept: GASTROENTEROLOGY | Facility: CLINIC | Age: 86
End: 2021-01-05
Payer: MEDICARE

## 2021-01-05 VITALS — HEIGHT: 60 IN | BODY MASS INDEX: 16.48 KG/M2 | WEIGHT: 83.94 LBS

## 2021-01-05 VITALS
BODY MASS INDEX: 15.69 KG/M2 | DIASTOLIC BLOOD PRESSURE: 60 MMHG | SYSTOLIC BLOOD PRESSURE: 127 MMHG | TEMPERATURE: 99 F | HEART RATE: 101 BPM | WEIGHT: 83 LBS

## 2021-01-05 DIAGNOSIS — G61.82 MULTIFOCAL MOTOR NEUROPATHY: Primary | ICD-10-CM

## 2021-01-05 DIAGNOSIS — R63.4 WEIGHT LOSS: ICD-10-CM

## 2021-01-05 DIAGNOSIS — R47.1 DYSARTHRIA: ICD-10-CM

## 2021-01-05 DIAGNOSIS — R53.1 WEAKNESS: ICD-10-CM

## 2021-01-05 DIAGNOSIS — R13.10 DYSPHAGIA, UNSPECIFIED TYPE: ICD-10-CM

## 2021-01-05 DIAGNOSIS — G61.82 MULTIFOCAL MOTOR NEUROPATHY: ICD-10-CM

## 2021-01-05 DIAGNOSIS — R13.12 OROPHARYNGEAL DYSPHAGIA: Primary | ICD-10-CM

## 2021-01-05 DIAGNOSIS — Z79.01 CURRENT USE OF ANTICOAGULANT THERAPY: ICD-10-CM

## 2021-01-05 DIAGNOSIS — I69.30 HISTORY OF CVA WITH RESIDUAL DEFICIT: ICD-10-CM

## 2021-01-05 LAB — IGA SERPL-MCNC: 285 MG/DL (ref 40–350)

## 2021-01-05 PROCEDURE — 99417 PROLNG OP E/M EACH 15 MIN: CPT | Mod: S$PBB,ICN,, | Performed by: PSYCHIATRY & NEUROLOGY

## 2021-01-05 PROCEDURE — 82784 ASSAY IGA/IGD/IGG/IGM EACH: CPT

## 2021-01-05 PROCEDURE — 99214 OFFICE O/P EST MOD 30 MIN: CPT | Mod: S$PBB,,, | Performed by: NURSE PRACTITIONER

## 2021-01-05 PROCEDURE — 99417 PR PROLONGED SVC, OUTPT, W/WO DIRECT PT CONTACT,  EA ADDTL 15 MIN: ICD-10-PCS | Mod: S$PBB,ICN,, | Performed by: PSYCHIATRY & NEUROLOGY

## 2021-01-05 PROCEDURE — 99215 OFFICE O/P EST HI 40 MIN: CPT | Mod: S$PBB,ICN,, | Performed by: PSYCHIATRY & NEUROLOGY

## 2021-01-05 PROCEDURE — 99999 PR PBB SHADOW E&M-EST. PATIENT-LVL III: ICD-10-PCS | Mod: PBBFAC,,, | Performed by: NURSE PRACTITIONER

## 2021-01-05 PROCEDURE — 99214 OFFICE O/P EST MOD 30 MIN: CPT | Mod: PBBFAC,27,PN | Performed by: PSYCHIATRY & NEUROLOGY

## 2021-01-05 PROCEDURE — 99999 PR PBB SHADOW E&M-EST. PATIENT-LVL IV: ICD-10-PCS | Mod: PBBFAC,,, | Performed by: PSYCHIATRY & NEUROLOGY

## 2021-01-05 PROCEDURE — 99214 PR OFFICE/OUTPT VISIT, EST, LEVL IV, 30-39 MIN: ICD-10-PCS | Mod: S$PBB,,, | Performed by: NURSE PRACTITIONER

## 2021-01-05 PROCEDURE — 99215 PR OFFICE/OUTPT VISIT, EST, LEVL V, 40-54 MIN: ICD-10-PCS | Mod: S$PBB,ICN,, | Performed by: PSYCHIATRY & NEUROLOGY

## 2021-01-05 PROCEDURE — 99999 PR PBB SHADOW E&M-EST. PATIENT-LVL III: CPT | Mod: PBBFAC,,, | Performed by: NURSE PRACTITIONER

## 2021-01-05 PROCEDURE — 99213 OFFICE O/P EST LOW 20 MIN: CPT | Mod: PBBFAC,PO | Performed by: NURSE PRACTITIONER

## 2021-01-05 PROCEDURE — 99999 PR PBB SHADOW E&M-EST. PATIENT-LVL IV: CPT | Mod: PBBFAC,,, | Performed by: PSYCHIATRY & NEUROLOGY

## 2021-01-05 RX ORDER — SODIUM CHLORIDE 0.9 % (FLUSH) 0.9 %
10 SYRINGE (ML) INJECTION
Status: CANCELLED | OUTPATIENT
Start: 2021-01-06

## 2021-01-05 RX ORDER — HEPARIN 100 UNIT/ML
500 SYRINGE INTRAVENOUS
Status: CANCELLED | OUTPATIENT
Start: 2021-01-06

## 2021-01-05 RX ORDER — ACETAMINOPHEN 325 MG/1
325 TABLET ORAL EVERY 4 HOURS PRN
Status: CANCELLED | OUTPATIENT
Start: 2021-01-06

## 2021-01-06 ENCOUNTER — PATIENT MESSAGE (OUTPATIENT)
Dept: FAMILY MEDICINE | Facility: CLINIC | Age: 86
End: 2021-01-06

## 2021-01-07 ENCOUNTER — PATIENT MESSAGE (OUTPATIENT)
Dept: FAMILY MEDICINE | Facility: CLINIC | Age: 86
End: 2021-01-07

## 2021-01-07 DIAGNOSIS — I10 ESSENTIAL HYPERTENSION: ICD-10-CM

## 2021-01-07 DIAGNOSIS — I63.319 CEREBROVASCULAR ACCIDENT (CVA) DUE TO THROMBOSIS OF MIDDLE CEREBRAL ARTERY, UNSPECIFIED BLOOD VESSEL LATERALITY: ICD-10-CM

## 2021-01-07 DIAGNOSIS — R13.10 DYSPHAGIA, UNSPECIFIED TYPE: ICD-10-CM

## 2021-01-07 DIAGNOSIS — Z01.812 PRE-PROCEDURE LAB EXAM: ICD-10-CM

## 2021-01-07 DIAGNOSIS — Z93.1 S/P PERCUTANEOUS ENDOSCOPIC GASTROSTOMY (PEG) TUBE PLACEMENT: Primary | ICD-10-CM

## 2021-01-07 DIAGNOSIS — I69.391 DYSPHAGIA AS LATE EFFECT OF CEREBROVASCULAR ACCIDENT (CVA): ICD-10-CM

## 2021-01-07 DIAGNOSIS — R63.4 WEIGHT LOSS: ICD-10-CM

## 2021-01-08 ENCOUNTER — TELEPHONE (OUTPATIENT)
Dept: GASTROENTEROLOGY | Facility: CLINIC | Age: 86
End: 2021-01-08

## 2021-01-09 PROCEDURE — G0179 PR HOME HEALTH MD RECERTIFICATION: ICD-10-PCS | Mod: ,,, | Performed by: FAMILY MEDICINE

## 2021-01-09 PROCEDURE — G0179 MD RECERTIFICATION HHA PT: HCPCS | Mod: ,,, | Performed by: FAMILY MEDICINE

## 2021-01-11 ENCOUNTER — LAB VISIT (OUTPATIENT)
Dept: FAMILY MEDICINE | Facility: CLINIC | Age: 86
End: 2021-01-11
Payer: MEDICARE

## 2021-01-11 DIAGNOSIS — Z01.812 PRE-PROCEDURE LAB EXAM: ICD-10-CM

## 2021-01-11 PROCEDURE — U0003 INFECTIOUS AGENT DETECTION BY NUCLEIC ACID (DNA OR RNA); SEVERE ACUTE RESPIRATORY SYNDROME CORONAVIRUS 2 (SARS-COV-2) (CORONAVIRUS DISEASE [COVID-19]), AMPLIFIED PROBE TECHNIQUE, MAKING USE OF HIGH THROUGHPUT TECHNOLOGIES AS DESCRIBED BY CMS-2020-01-R: HCPCS

## 2021-01-12 ENCOUNTER — TELEPHONE (OUTPATIENT)
Dept: GASTROENTEROLOGY | Facility: CLINIC | Age: 86
End: 2021-01-12

## 2021-01-12 LAB
DEPRECATED GD1B DISIALYL IGG SER QL: NEGATIVE
DEPRECATED GD1B DISIALYL IGM SER QL: NEGATIVE
GM1 ASIALO IGG SER QL: NEGATIVE
GM1 ASIALO IGM SER QL: NEGATIVE
GM1 GANGL IGG SER QL: NEGATIVE
GM1 GANGL IGM SER QL: NEGATIVE
SARS-COV-2 RNA RESP QL NAA+PROBE: NOT DETECTED

## 2021-01-14 ENCOUNTER — EXTERNAL HOME HEALTH (OUTPATIENT)
Dept: HOME HEALTH SERVICES | Facility: HOSPITAL | Age: 86
End: 2021-01-14
Payer: MEDICARE

## 2021-01-14 PROBLEM — E46 MALNUTRITION: Status: ACTIVE | Noted: 2021-01-14

## 2021-01-25 ENCOUNTER — IMMUNIZATION (OUTPATIENT)
Dept: FAMILY MEDICINE | Facility: CLINIC | Age: 86
End: 2021-01-25
Payer: MEDICARE

## 2021-01-25 DIAGNOSIS — Z23 NEED FOR VACCINATION: Primary | ICD-10-CM

## 2021-01-25 PROCEDURE — 0002A COVID-19, MRNA, LNP-S, PF, 30 MCG/0.3 ML DOSE VACCINE: CPT | Mod: PBBFAC,PO

## 2021-01-25 PROCEDURE — 91300 COVID-19, MRNA, LNP-S, PF, 30 MCG/0.3 ML DOSE VACCINE: CPT | Mod: PBBFAC,PO

## 2021-02-02 ENCOUNTER — DOCUMENT SCAN (OUTPATIENT)
Dept: HOME HEALTH SERVICES | Facility: HOSPITAL | Age: 86
End: 2021-02-02
Payer: MEDICARE

## 2021-02-09 ENCOUNTER — PATIENT MESSAGE (OUTPATIENT)
Dept: FAMILY MEDICINE | Facility: CLINIC | Age: 86
End: 2021-02-09

## 2021-02-09 RX ORDER — PANTOPRAZOLE SODIUM 40 MG/1
40 TABLET, DELAYED RELEASE ORAL DAILY
Qty: 90 TABLET | OUTPATIENT
Start: 2021-02-09 | End: 2021-02-11 | Stop reason: SDUPTHER

## 2021-02-11 ENCOUNTER — PATIENT MESSAGE (OUTPATIENT)
Dept: FAMILY MEDICINE | Facility: CLINIC | Age: 86
End: 2021-02-11

## 2021-02-11 RX ORDER — PANTOPRAZOLE SODIUM 40 MG/1
40 TABLET, DELAYED RELEASE ORAL DAILY
Qty: 90 TABLET | OUTPATIENT
Start: 2021-02-11

## 2021-02-12 ENCOUNTER — TELEPHONE (OUTPATIENT)
Dept: FAMILY MEDICINE | Facility: CLINIC | Age: 86
End: 2021-02-12

## 2021-02-19 ENCOUNTER — DOCUMENT SCAN (OUTPATIENT)
Dept: HOME HEALTH SERVICES | Facility: HOSPITAL | Age: 86
End: 2021-02-19
Payer: MEDICARE

## 2021-03-01 ENCOUNTER — TELEPHONE (OUTPATIENT)
Dept: GASTROENTEROLOGY | Facility: CLINIC | Age: 86
End: 2021-03-01

## 2021-03-01 RX ORDER — ONDANSETRON 4 MG/1
4 TABLET, ORALLY DISINTEGRATING ORAL EVERY 8 HOURS PRN
Qty: 20 TABLET | Refills: 0 | Status: SHIPPED | OUTPATIENT
Start: 2021-03-01 | End: 2021-05-04

## 2021-03-04 ENCOUNTER — PATIENT MESSAGE (OUTPATIENT)
Dept: FAMILY MEDICINE | Facility: CLINIC | Age: 86
End: 2021-03-04

## 2021-03-10 ENCOUNTER — EXTERNAL HOME HEALTH (OUTPATIENT)
Dept: HOME HEALTH SERVICES | Facility: HOSPITAL | Age: 86
End: 2021-03-10
Payer: MEDICARE

## 2021-03-10 PROCEDURE — G0179 PR HOME HEALTH MD RECERTIFICATION: ICD-10-PCS | Mod: ,,, | Performed by: FAMILY MEDICINE

## 2021-03-10 PROCEDURE — G0179 MD RECERTIFICATION HHA PT: HCPCS | Mod: ,,, | Performed by: FAMILY MEDICINE

## 2021-03-17 ENCOUNTER — DOCUMENT SCAN (OUTPATIENT)
Dept: HOME HEALTH SERVICES | Facility: HOSPITAL | Age: 86
End: 2021-03-17
Payer: MEDICARE

## 2021-04-21 ENCOUNTER — PATIENT MESSAGE (OUTPATIENT)
Dept: FAMILY MEDICINE | Facility: CLINIC | Age: 86
End: 2021-04-21

## 2021-04-21 DIAGNOSIS — R29.818 FUNCTIONAL NEUROLOGIC COMPLAINT: Primary | ICD-10-CM

## 2021-04-27 ENCOUNTER — TELEPHONE (OUTPATIENT)
Dept: GASTROENTEROLOGY | Facility: CLINIC | Age: 86
End: 2021-04-27

## 2021-04-28 ENCOUNTER — TELEPHONE (OUTPATIENT)
Dept: NEUROLOGY | Facility: CLINIC | Age: 86
End: 2021-04-28

## 2021-05-03 ENCOUNTER — DOCUMENT SCAN (OUTPATIENT)
Dept: HOME HEALTH SERVICES | Facility: HOSPITAL | Age: 86
End: 2021-05-03
Payer: MEDICARE

## 2021-05-04 ENCOUNTER — TELEPHONE (OUTPATIENT)
Dept: FAMILY MEDICINE | Facility: CLINIC | Age: 86
End: 2021-05-04

## 2021-05-07 ENCOUNTER — EXTERNAL HOME HEALTH (OUTPATIENT)
Dept: HOME HEALTH SERVICES | Facility: HOSPITAL | Age: 86
End: 2021-05-07
Payer: MEDICARE

## 2021-05-09 PROCEDURE — G0179 PR HOME HEALTH MD RECERTIFICATION: ICD-10-PCS | Mod: ,,, | Performed by: FAMILY MEDICINE

## 2021-05-09 PROCEDURE — G0179 MD RECERTIFICATION HHA PT: HCPCS | Mod: ,,, | Performed by: FAMILY MEDICINE

## 2021-05-11 ENCOUNTER — PATIENT MESSAGE (OUTPATIENT)
Dept: FAMILY MEDICINE | Facility: CLINIC | Age: 86
End: 2021-05-11

## 2021-05-12 ENCOUNTER — PATIENT MESSAGE (OUTPATIENT)
Dept: FAMILY MEDICINE | Facility: CLINIC | Age: 86
End: 2021-05-12

## 2021-05-24 ENCOUNTER — PATIENT MESSAGE (OUTPATIENT)
Dept: FAMILY MEDICINE | Facility: CLINIC | Age: 86
End: 2021-05-24

## 2021-05-25 ENCOUNTER — DOCUMENT SCAN (OUTPATIENT)
Dept: HOME HEALTH SERVICES | Facility: HOSPITAL | Age: 86
End: 2021-05-25
Payer: MEDICARE

## 2021-05-25 ENCOUNTER — PATIENT MESSAGE (OUTPATIENT)
Dept: FAMILY MEDICINE | Facility: CLINIC | Age: 86
End: 2021-05-25

## 2021-05-25 DIAGNOSIS — M54.2 NECK PAIN: Primary | ICD-10-CM

## 2021-05-25 RX ORDER — TIZANIDINE 2 MG/1
2 TABLET ORAL EVERY 8 HOURS PRN
Qty: 30 TABLET | Refills: 0 | Status: SHIPPED | OUTPATIENT
Start: 2021-05-25 | End: 2021-07-08

## 2021-05-27 ENCOUNTER — PATIENT MESSAGE (OUTPATIENT)
Dept: FAMILY MEDICINE | Facility: CLINIC | Age: 86
End: 2021-05-27

## 2021-05-27 DIAGNOSIS — J32.9 SINUSITIS, UNSPECIFIED CHRONICITY, UNSPECIFIED LOCATION: Primary | ICD-10-CM

## 2021-05-28 ENCOUNTER — OFFICE VISIT (OUTPATIENT)
Dept: FAMILY MEDICINE | Facility: CLINIC | Age: 86
End: 2021-05-28
Payer: MEDICARE

## 2021-05-28 DIAGNOSIS — J06.9 ACUTE UPPER RESPIRATORY INFECTION, UNSPECIFIED: Primary | ICD-10-CM

## 2021-05-28 DIAGNOSIS — R09.82 POST-NASAL DRIP: ICD-10-CM

## 2021-05-28 DIAGNOSIS — R09.89 SINUS COMPLAINT: ICD-10-CM

## 2021-05-28 DIAGNOSIS — R05.9 COUGHING: ICD-10-CM

## 2021-05-28 PROCEDURE — 99214 OFFICE O/P EST MOD 30 MIN: CPT | Mod: 95,,, | Performed by: NURSE PRACTITIONER

## 2021-05-28 PROCEDURE — 99214 PR OFFICE/OUTPT VISIT, EST, LEVL IV, 30-39 MIN: ICD-10-PCS | Mod: 95,,, | Performed by: NURSE PRACTITIONER

## 2021-05-28 RX ORDER — CEPHALEXIN 500 MG/1
500 CAPSULE ORAL 3 TIMES DAILY
Qty: 21 CAPSULE | Refills: 0 | Status: SHIPPED | OUTPATIENT
Start: 2021-05-28 | End: 2021-06-04

## 2021-05-28 RX ORDER — AMOXICILLIN 200 MG/5ML
400 POWDER, FOR SUSPENSION ORAL 3 TIMES DAILY
Qty: 300 ML | Refills: 0 | Status: SHIPPED | OUTPATIENT
Start: 2021-05-28 | End: 2021-06-07

## 2021-06-10 ENCOUNTER — PATIENT MESSAGE (OUTPATIENT)
Dept: FAMILY MEDICINE | Facility: CLINIC | Age: 86
End: 2021-06-10

## 2021-06-10 DIAGNOSIS — I63.319 CEREBROVASCULAR ACCIDENT (CVA) DUE TO THROMBOSIS OF MIDDLE CEREBRAL ARTERY, UNSPECIFIED BLOOD VESSEL LATERALITY: Primary | ICD-10-CM

## 2021-06-10 DIAGNOSIS — G61.82 MULTIFOCAL MOTOR NEUROPATHY: ICD-10-CM

## 2021-06-10 DIAGNOSIS — R09.89 CHOKING EPISODE OCCURRING AT NIGHT: ICD-10-CM

## 2021-06-17 ENCOUNTER — TELEPHONE (OUTPATIENT)
Dept: NEUROLOGY | Facility: CLINIC | Age: 86
End: 2021-06-17
Payer: MEDICARE

## 2021-06-18 ENCOUNTER — TELEPHONE (OUTPATIENT)
Dept: NEUROLOGY | Facility: CLINIC | Age: 86
End: 2021-06-18

## 2021-06-23 ENCOUNTER — TELEPHONE (OUTPATIENT)
Dept: NEUROLOGY | Facility: CLINIC | Age: 86
End: 2021-06-23

## 2021-06-23 ENCOUNTER — PATIENT MESSAGE (OUTPATIENT)
Dept: FAMILY MEDICINE | Facility: CLINIC | Age: 86
End: 2021-06-23

## 2021-06-24 ENCOUNTER — DOCUMENT SCAN (OUTPATIENT)
Dept: HOME HEALTH SERVICES | Facility: HOSPITAL | Age: 86
End: 2021-06-24
Payer: MEDICARE

## 2021-07-08 PROCEDURE — G0179 PR HOME HEALTH MD RECERTIFICATION: ICD-10-PCS | Mod: ,,, | Performed by: FAMILY MEDICINE

## 2021-07-08 PROCEDURE — G0179 MD RECERTIFICATION HHA PT: HCPCS | Mod: ,,, | Performed by: FAMILY MEDICINE

## 2021-07-10 ENCOUNTER — PATIENT MESSAGE (OUTPATIENT)
Dept: FAMILY MEDICINE | Facility: CLINIC | Age: 86
End: 2021-07-10

## 2021-07-10 ENCOUNTER — PATIENT MESSAGE (OUTPATIENT)
Dept: NEUROLOGY | Facility: CLINIC | Age: 86
End: 2021-07-10

## 2021-07-12 ENCOUNTER — PATIENT MESSAGE (OUTPATIENT)
Dept: FAMILY MEDICINE | Facility: CLINIC | Age: 86
End: 2021-07-12

## 2021-07-14 ENCOUNTER — EXTERNAL HOME HEALTH (OUTPATIENT)
Dept: HOME HEALTH SERVICES | Facility: HOSPITAL | Age: 86
End: 2021-07-14
Payer: MEDICARE

## 2021-07-15 ENCOUNTER — TELEPHONE (OUTPATIENT)
Dept: NEUROLOGY | Facility: CLINIC | Age: 86
End: 2021-07-15

## 2021-07-19 ENCOUNTER — TELEPHONE (OUTPATIENT)
Dept: NEUROLOGY | Facility: CLINIC | Age: 86
End: 2021-07-19

## 2021-07-21 ENCOUNTER — DOCUMENT SCAN (OUTPATIENT)
Dept: HOME HEALTH SERVICES | Facility: HOSPITAL | Age: 86
End: 2021-07-21
Payer: MEDICARE

## 2021-07-21 ENCOUNTER — PATIENT MESSAGE (OUTPATIENT)
Dept: FAMILY MEDICINE | Facility: CLINIC | Age: 86
End: 2021-07-21

## 2021-07-23 ENCOUNTER — PATIENT MESSAGE (OUTPATIENT)
Dept: FAMILY MEDICINE | Facility: CLINIC | Age: 86
End: 2021-07-23

## 2021-07-26 ENCOUNTER — PATIENT MESSAGE (OUTPATIENT)
Dept: FAMILY MEDICINE | Facility: CLINIC | Age: 86
End: 2021-07-26

## 2021-07-26 ENCOUNTER — HOSPITAL ENCOUNTER (OUTPATIENT)
Dept: RADIOLOGY | Facility: HOSPITAL | Age: 86
Discharge: HOME | End: 2021-07-26
Attending: FAMILY MEDICINE
Payer: MEDICARE

## 2021-07-26 ENCOUNTER — OFFICE VISIT (OUTPATIENT)
Dept: FAMILY MEDICINE | Facility: CLINIC | Age: 86
End: 2021-07-26
Payer: MEDICARE

## 2021-07-26 VITALS
HEART RATE: 101 BPM | HEIGHT: 60 IN | SYSTOLIC BLOOD PRESSURE: 118 MMHG | WEIGHT: 91 LBS | BODY MASS INDEX: 17.87 KG/M2 | OXYGEN SATURATION: 97 % | TEMPERATURE: 98 F | DIASTOLIC BLOOD PRESSURE: 76 MMHG

## 2021-07-26 DIAGNOSIS — R06.02 SHORTNESS OF BREATH: ICD-10-CM

## 2021-07-26 DIAGNOSIS — R06.02 SHORTNESS OF BREATH: Primary | ICD-10-CM

## 2021-07-26 DIAGNOSIS — R05.9 COUGH: ICD-10-CM

## 2021-07-26 DIAGNOSIS — F41.9 ANXIETY: ICD-10-CM

## 2021-07-26 DIAGNOSIS — R11.0 NAUSEA: ICD-10-CM

## 2021-07-26 DIAGNOSIS — R53.1 WEAKNESS: ICD-10-CM

## 2021-07-26 PROCEDURE — 99999 PR PBB SHADOW E&M-EST. PATIENT-LVL IV: CPT | Mod: PBBFAC,,, | Performed by: FAMILY MEDICINE

## 2021-07-26 PROCEDURE — 99214 OFFICE O/P EST MOD 30 MIN: CPT | Mod: S$PBB,,, | Performed by: FAMILY MEDICINE

## 2021-07-26 PROCEDURE — 99214 PR OFFICE/OUTPT VISIT, EST, LEVL IV, 30-39 MIN: ICD-10-PCS | Mod: S$PBB,,, | Performed by: FAMILY MEDICINE

## 2021-07-26 PROCEDURE — 71046 X-RAY EXAM CHEST 2 VIEWS: CPT | Mod: TC,FY,PO

## 2021-07-26 PROCEDURE — 71046 XR CHEST PA AND LATERAL: ICD-10-PCS | Mod: 26,,, | Performed by: RADIOLOGY

## 2021-07-26 PROCEDURE — 99214 OFFICE O/P EST MOD 30 MIN: CPT | Mod: PBBFAC,PO | Performed by: FAMILY MEDICINE

## 2021-07-26 PROCEDURE — 71046 X-RAY EXAM CHEST 2 VIEWS: CPT | Mod: 26,,, | Performed by: RADIOLOGY

## 2021-07-26 PROCEDURE — 99999 PR PBB SHADOW E&M-EST. PATIENT-LVL IV: ICD-10-PCS | Mod: PBBFAC,,, | Performed by: FAMILY MEDICINE

## 2021-07-26 RX ORDER — ONDANSETRON 8 MG/1
8 TABLET, ORALLY DISINTEGRATING ORAL EVERY 6 HOURS PRN
Qty: 60 TABLET | Refills: 1 | Status: SHIPPED | OUTPATIENT
Start: 2021-07-26

## 2021-07-26 RX ORDER — LORAZEPAM 2 MG/ML
0.05 CONCENTRATE ORAL EVERY 8 HOURS PRN
Qty: 30 ML | Refills: 0 | Status: SHIPPED | OUTPATIENT
Start: 2021-07-26 | End: 2021-08-25

## 2021-07-29 ENCOUNTER — PATIENT MESSAGE (OUTPATIENT)
Dept: FAMILY MEDICINE | Facility: CLINIC | Age: 86
End: 2021-07-29

## 2021-07-29 DIAGNOSIS — Z97.8 USES FEEDING TUBE: ICD-10-CM

## 2021-07-29 DIAGNOSIS — E44.1 MILD PROTEIN-CALORIE MALNUTRITION: ICD-10-CM

## 2021-07-29 DIAGNOSIS — I63.319 CEREBROVASCULAR ACCIDENT (CVA) DUE TO THROMBOSIS OF MIDDLE CEREBRAL ARTERY, UNSPECIFIED BLOOD VESSEL LATERALITY: Primary | ICD-10-CM

## 2021-07-29 RX ORDER — NUT TX, LACT-REDUCED, IRON 0.09G-2.25
237 LIQUID (ML) ORAL 3 TIMES DAILY
Qty: 90 BOTTLE | Refills: 11 | OUTPATIENT
Start: 2021-07-29

## 2021-07-30 ENCOUNTER — TELEPHONE (OUTPATIENT)
Dept: FAMILY MEDICINE | Facility: CLINIC | Age: 86
End: 2021-07-30

## 2021-08-02 PROBLEM — R53.1 RIGHT SIDED WEAKNESS: Status: ACTIVE | Noted: 2021-08-02

## 2021-08-02 PROBLEM — R09.02 HYPOXIA: Status: ACTIVE | Noted: 2021-08-02

## 2021-08-02 PROBLEM — Z71.89 ADVANCE CARE PLANNING: Status: ACTIVE | Noted: 2021-08-02

## 2021-08-02 PROBLEM — I25.10 CORONARY ARTERY DISEASE INVOLVING NATIVE CORONARY ARTERY OF NATIVE HEART WITHOUT ANGINA PECTORIS: Status: ACTIVE | Noted: 2021-08-02

## 2021-08-02 PROBLEM — T17.500A MUCUS PLUGGING OF BRONCHI: Status: ACTIVE | Noted: 2021-08-02

## 2021-08-02 PROBLEM — Z93.1 PEG (PERCUTANEOUS ENDOSCOPIC GASTROSTOMY) STATUS: Status: ACTIVE | Noted: 2021-08-02

## 2021-08-02 PROBLEM — J96.01 ACUTE HYPOXEMIC RESPIRATORY FAILURE: Status: ACTIVE | Noted: 2021-08-02

## 2021-08-02 PROBLEM — R79.89 POSITIVE D DIMER: Status: ACTIVE | Noted: 2021-08-02

## 2021-08-02 PROBLEM — Z86.73 HISTORY OF CVA (CEREBROVASCULAR ACCIDENT): Status: ACTIVE | Noted: 2021-08-02

## 2021-08-03 ENCOUNTER — PATIENT OUTREACH (OUTPATIENT)
Dept: ADMINISTRATIVE | Facility: OTHER | Age: 86
End: 2021-08-03

## 2021-08-04 ENCOUNTER — TELEPHONE (OUTPATIENT)
Dept: FAMILY MEDICINE | Facility: CLINIC | Age: 86
End: 2021-08-04

## 2021-08-04 DIAGNOSIS — J96.01 ACUTE HYPOXEMIC RESPIRATORY FAILURE: Primary | ICD-10-CM

## 2021-08-06 ENCOUNTER — TELEPHONE (OUTPATIENT)
Dept: NEUROLOGY | Facility: CLINIC | Age: 86
End: 2021-08-06

## 2021-08-08 ENCOUNTER — PATIENT MESSAGE (OUTPATIENT)
Dept: FAMILY MEDICINE | Facility: CLINIC | Age: 86
End: 2021-08-08

## 2021-09-01 ENCOUNTER — DOCUMENT SCAN (OUTPATIENT)
Dept: HOME HEALTH SERVICES | Facility: HOSPITAL | Age: 86
End: 2021-09-01
Payer: MEDICARE

## 2021-09-17 ENCOUNTER — PATIENT MESSAGE (OUTPATIENT)
Dept: FAMILY MEDICINE | Facility: CLINIC | Age: 86
End: 2021-09-17

## 2021-10-27 RX ORDER — PRAVASTATIN SODIUM 40 MG/1
TABLET ORAL
Qty: 90 TABLET | Refills: 0 | Status: SHIPPED | OUTPATIENT
Start: 2021-10-27

## 2021-11-17 ENCOUNTER — TELEPHONE (OUTPATIENT)
Dept: FAMILY MEDICINE | Facility: CLINIC | Age: 86
End: 2021-11-17
Payer: MEDICARE

## 2021-12-03 ENCOUNTER — TELEPHONE (OUTPATIENT)
Dept: FAMILY MEDICINE | Facility: CLINIC | Age: 86
End: 2021-12-03
Payer: MEDICARE

## 2023-09-07 NOTE — PROGRESS NOTES
Can send to Dr valdivia to see if she can help Subjective:       Patient ID: Muna Cedillo is a 85 y.o. female.    Chief Complaint: Other (cough, hoarseness, shortness of breath)    HPI   Patient has two-month dry cough. She was treated for bronchitis with antibiotics X 2 rounds at [facility] by Dr. Cooper (?spelling). When cough did not improve, Dr. Cooper recommended Nexium. Patient then saw her PCP who prescribed Xyzal. When cough did not improve, she was given Flonase and a chest x-ray was obtained, which was negative, and referred to ENT. Patient denies significant allergic stigmata:  No itchy, red, watery eyes; no itchy, red, watery nose; no excessive sneezing or stuffiness. Patient denies s/s of acute bacterial sinusitis:  No mucopus from nose or throat, no facial swelling/pain, no dental pain, no diminished olfaction/taste, no headaches around the eyes, no sore throat or productive cough. Her current symptoms are:  Review of Systems   HENT: Positive for postnasal drip, trouble swallowing and voice change. Negative for congestion, dental problem, facial swelling, rhinorrhea, sinus pressure, sinus pain, sneezing and sore throat.         Frequent throat clearing  Frequent sensation of thick or too much mucus in the back of her throat  Sensation of lump or swelling in the back of her throat  Frequent throat irritation   Eyes: Negative for discharge, redness and itching.   Respiratory: Positive for cough (dry) and choking.        Objective:      Physical Exam   Constitutional: She is oriented to person, place, and time. Vital signs are normal. She appears well-developed and well-nourished. She is cooperative. She does not appear ill. No distress.   HENT:   Head: Normocephalic and atraumatic.   Right Ear: Hearing, tympanic membrane, external ear and ear canal normal. Tympanic membrane is not erythematous. No middle ear effusion.   Left Ear: Hearing, tympanic membrane, external ear and ear canal normal. Tympanic membrane is not erythematous.  No middle ear  effusion.   Nose: Nose normal. No mucosal edema or rhinorrhea. Right sinus exhibits no maxillary sinus tenderness and no frontal sinus tenderness. Left sinus exhibits no maxillary sinus tenderness and no frontal sinus tenderness.   Mouth/Throat: Uvula is midline, oropharynx is clear and moist and mucous membranes are normal. Mucous membranes are not pale, not dry and not cyanotic. No oral lesions. No oropharyngeal exudate, posterior oropharyngeal edema or posterior oropharyngeal erythema.   Eyes: Pupils are equal, round, and reactive to light. Conjunctivae, EOM and lids are normal. Right eye exhibits no discharge. Left eye exhibits no discharge. No scleral icterus.   Neck: Trachea normal and normal range of motion. Neck supple. No tracheal deviation present. No thyroid mass and no thyromegaly present.   Cardiovascular: Normal rate.   Pulmonary/Chest: Effort normal. No stridor. No respiratory distress. She has no wheezes.   Musculoskeletal: Normal range of motion.   Lymphadenopathy:        Head (right side): No submental, no submandibular, no tonsillar, no preauricular and no posterior auricular adenopathy present.        Head (left side): No submental, no submandibular, no tonsillar, no preauricular and no posterior auricular adenopathy present.     She has no cervical adenopathy.        Right cervical: No superficial cervical and no posterior cervical adenopathy present.       Left cervical: No superficial cervical and no posterior cervical adenopathy present.   Neurological: She is alert and oriented to person, place, and time. She has normal strength. Coordination and gait normal.   Skin: Skin is warm, dry and intact. No lesion and no rash noted. She is not diaphoretic. No cyanosis. No pallor.   Psychiatric: She has a normal mood and affect. Her speech is normal and behavior is normal. Judgment and thought content normal. Cognition and memory are normal.   Nursing note and vitals reviewed.      Procedure:  Flexible laryngoscopy    In order to fully examine the upper aerodigestive tract, including the larynx, in a patient with a hyperactive gag reflex, and suboptimal visualization with indirect mirror exam,  flexible endoscopy is required.   After explaining the procedure and obtaining verbal consent, a timeout was performed with the patient's participation according to the universal protocol. Both nasal cavities were anesthetized with 4% Xylocaine spray mixed with Nicanor-Synephrine. The flexible laryngoscope  was inserted into the nasal cavity and advanced to visualize the nasal cavity, nasopharynx, the posterior oropharynx, hypopharynx, and the endolarynx with the  findings noted. The scope was removed and the procedure terminated. The patient tolerated this procedure well without apparent complication.     OVERALL FINDINGS  Nasopharynx - the torus is clear. There are no lesions of the posterior wall.   Oropharynx - no lesions of the tongue base. There is no obvious fullness or asymmetry.  Hypopharynx - there are no lesions of the pyriform sinuses or postcricoid region   Larynx - there are no lesions of the supraglottic or glottic larynx.  Vocal fold mobility is normal.     SPECIFIC FINDINGS  Adenoid tissue - normal   Nasopharynx & eustachian tube orifices - normal   Posterior pharyngeal wall - normal   Base of tongue - normal   Epiglottis - normal   Valleculae - normal   Pyriform sinuses - normal   False vocal cords - normal   True vocal cords - normal  Arytenoids - normal   Interarytenoid space - erythema, edema  Right BOT / Pharynx    Left BOT / Pharynx    Larynx    Larynx      Assessment:     LPRD/GERD manifested as chronic cough, globus sensation, dysphonia, dysphagia, throat irritation      Plan:     Discussed typical constellation of symptoms seen with acute allergic rhinitis exacerbation, acute bacterial sinusitis, acute bacterial pharyngitis/tonsillitis, and LPRD/GERD.    Discussed common differentials for  chronic cough may include but not limited to: allergic rhinitis (patient states allergy meds have not helped and since she denies all allergic stigmata, she may decide to discontinue), silent reflux (see GI or take daily reflux meds), sinusitis (sinus imaging not warranted d/t lack of suggestive symptoms), pulmonary issues (patient states she has never had h/o asthma or wheezing, has never smoked, has never required inhaler or steroids for breathing; has an upcoming appt with a pulmonologist). Patient does not take an ACE-inhibitor.    Advised/Cautioned: The results of today's ENT exam and flexible endoscopy were detailed to the patient and all questions were answered. Patient education centered around GERD, known exacerbants and contemporary treatment options. Laryngoscope photos were given to the patient. Handouts given on LPRD and GERD were given to the patient. After review of these, patient elected to be placed on PPI 40 mg QAM on an empty stomach for the next 6-8 weeks, and H2-blocker QHS. I encouraged the patient once she has completed the evening meal to not snack or consume any other food products or caffeinated beverages for at least  minutes before retiring. Finally, I encouraged the patient to sleep about 30 degrees above horizontal, and this can be facilitated by using 2-3 pillows or a wedge foam product. If the patient is not demonstrably improved in 6-8 weeks, consultation with gastroenterology may be indicated to rule out intrinsic disease in the lower esophagus, stomach, or proximal duodenum.